# Patient Record
Sex: FEMALE | Race: BLACK OR AFRICAN AMERICAN | Employment: FULL TIME | URBAN - METROPOLITAN AREA
[De-identification: names, ages, dates, MRNs, and addresses within clinical notes are randomized per-mention and may not be internally consistent; named-entity substitution may affect disease eponyms.]

---

## 2017-03-28 ENCOUNTER — HOSPITAL ENCOUNTER (EMERGENCY)
Age: 42
Discharge: HOME | End: 2017-03-28
Attending: EMERGENCY MEDICINE
Payer: COMMERCIAL

## 2017-03-28 VITALS
BODY MASS INDEX: 35.16 KG/M2 | WEIGHT: 232 LBS | SYSTOLIC BLOOD PRESSURE: 155 MMHG | DIASTOLIC BLOOD PRESSURE: 74 MMHG | HEIGHT: 68 IN | OXYGEN SATURATION: 100 % | HEART RATE: 82 BPM | TEMPERATURE: 98 F | RESPIRATION RATE: 14 BRPM

## 2017-03-28 DIAGNOSIS — J01.90 ACUTE SINUSITIS, RECURRENCE NOT SPECIFIED, UNSPECIFIED LOCATION: Primary | ICD-10-CM

## 2017-03-28 PROCEDURE — 99283 EMERGENCY DEPT VISIT LOW MDM: CPT | Performed by: PHYSICIAN ASSISTANT

## 2017-03-28 RX ORDER — PREDNISONE 50 MG/1
50 TABLET ORAL DAILY
Qty: 3 TAB | Refills: 0 | Status: SHIPPED | OUTPATIENT
Start: 2017-03-28 | End: 2017-03-31

## 2017-03-28 RX ORDER — AZITHROMYCIN 250 MG/1
TABLET, FILM COATED ORAL
Qty: 5 TAB | Refills: 0 | Status: SHIPPED | OUTPATIENT
Start: 2017-03-28 | End: 2017-04-02

## 2017-03-28 NOTE — LETTER
[facility] [facility] EMERGENCY DEPT One [address] [id number] [phone] Work/School Note Date: 3/28/2017 To Whom It May concern: 
 
Adilene Sorto was seen and treated today in the emergency room by the following provider(s): 
Physician Assistant: JENNIFER Aponte. Adilene Sorto may return to work on 3/30/17. Sincerely, Cesia Wallace RN

## 2017-03-28 NOTE — LETTER
[facility] [place] [place] [place] [address] [place] [address] [place] [id number] 
[phone] Work/School Note Date: 3/28/2017 To Whom It May concern: 
 
Luke Domingo was seen and treated today in the emergency room by the following provider(s): 
Attending Provider: Chilango Caicedo MD 
Physician Assistant: JENNIFER Young. Luke Domingo may return to work on 3-30-17. Sincerely, JENNIFER Young

## 2017-03-28 NOTE — DISCHARGE INSTRUCTIONS
Sinusitis: Care Instructions  Your Care Instructions    Sinusitis is an infection of the lining of the sinus cavities in your head. Sinusitis often follows a cold. It causes pain and pressure in your head and face. In most cases, sinusitis gets better on its own in 1 to 2 weeks. But some mild symptoms may last for several weeks. Sometimes antibiotics are needed. Follow-up care is a key part of your treatment and safety. Be sure to make and go to all appointments, and call your doctor if you are having problems. It's also a good idea to know your test results and keep a list of the medicines you take. How can you care for yourself at home? · Take an over-the-counter pain medicine, such as acetaminophen (Tylenol), ibuprofen (Advil, Motrin), or naproxen (Aleve). Read and follow all instructions on the label. · If the doctor prescribed antibiotics, take them as directed. Do not stop taking them just because you feel better. You need to take the full course of antibiotics. · Be careful when taking over-the-counter cold or flu medicines and Tylenol at the same time. Many of these medicines have acetaminophen, which is Tylenol. Read the labels to make sure that you are not taking more than the recommended dose. Too much acetaminophen (Tylenol) can be harmful. · Breathe warm, moist air from a steamy shower, a hot bath, or a sink filled with hot water. Avoid cold, dry air. Using a humidifier in your home may help. Follow the directions for cleaning the machine. · Use saline (saltwater) nasal washes to help keep your nasal passages open and wash out mucus and bacteria. You can buy saline nose drops at a grocery store or drugstore. Or you can make your own at home by adding 1 teaspoon of salt and 1 teaspoon of baking soda to 2 cups of distilled water. If you make your own, fill a bulb syringe with the solution, insert the tip into your nostril, and squeeze gently. Sidonie Shayla your nose.   · Put a hot, wet towel or a warm gel pack on your face 3 or 4 times a day for 5 to 10 minutes each time. · Try a decongestant nasal spray like oxymetazoline (Afrin). Do not use it for more than 3 days in a row. Using it for more than 3 days can make your congestion worse. When should you call for help? Call your doctor now or seek immediate medical care if:  · You have new or worse swelling or redness in your face or around your eyes. · You have a new or higher fever. Watch closely for changes in your health, and be sure to contact your doctor if:  · You have new or worse facial pain. · The mucus from your nose becomes thicker (like pus) or has new blood in it. · You are not getting better as expected. Where can you learn more? Go to http://adriel-janiya.info/. Enter F319 in the search box to learn more about \"Sinusitis: Care Instructions. \"  Current as of: July 29, 2016  Content Version: 11.2  © 3178-6908 [employer]. Care instructions adapted under license by [employer] (which disclaims liability or warranty for this information). If you have questions about a medical condition or this instruction, always ask your healthcare professional. [place] [address] any warranty or liability for your use of this information.

## 2017-03-28 NOTE — ED PROVIDER NOTES
Patient is a 35 y.o. female presenting with sinus problems. The history is provided by the patient. Sinus Infection    This is a new problem. The current episode started 2 days ago. The problem has been gradually worsening. There has been no fever. The pain is mild. The pain has been constant since onset. Associated symptoms include congestion, sinus pressure, sore throat and cough. She has tried decongestants for the symptoms. The treatment provided mild relief. History reviewed. No pertinent past medical history. Past Surgical History:   Procedure Laterality Date   [facility] GYN      D&C 7205         History reviewed. No pertinent family history. Social History     Social History    Marital status:      Spouse name: N/A    Number of children: N/A    Years of education: N/A     Occupational History    Not on file. Social History Main Topics    Smoking status: Never Smoker    Smokeless tobacco: Not on file    Alcohol use Yes      Comment: occ    Drug use: No    Sexual activity: Not on file     Other Topics Concern    Not on file     Social History Narrative         ALLERGIES: Pcn [penicillins]    Review of Systems   HENT: Positive for congestion, sinus pressure and sore throat. Respiratory: Positive for cough. All other systems reviewed and are negative. Vitals:    03/28/17 1727   BP: (!) 169/95   Pulse: 81   Resp: 18   Temp: 98.3 °F (36.8 °C)   SpO2: 100%   Weight: 105.2 kg (232 lb)   Height: 5' 8\" (1.727 m)            Physical Exam   Constitutional: She is oriented to person, place, and time. She appears well-developed and well-nourished. No distress. HENT:   Head: Normocephalic and atraumatic. Right Ear: External ear normal.   Left Ear: External ear normal.   Nasal/sinus congestion, maxillary sinus area pain to palpation, tms clear    Eyes: Conjunctivae and EOM are normal. Pupils are equal, round, and reactive to light. Neck: Normal range of motion. Neck supple. Cardiovascular: Normal rate, regular rhythm and normal heart sounds. Pulmonary/Chest: Effort normal and breath sounds normal. No respiratory distress. She has no wheezes. Abdominal: Soft. Bowel sounds are normal. There is no tenderness. There is no rebound and no guarding. Musculoskeletal: Normal range of motion. She exhibits no edema. Neurological: She is alert and oriented to person, place, and time. Skin: Skin is warm and dry. Psychiatric: She has a normal mood and affect. Nursing note and vitals reviewed.        MDM  Number of Diagnoses or Management Options  Diagnosis management comments: Sinusitis, will treat work note given       Amount and/or Complexity of Data Reviewed  Review and summarize past medical records: yes    Risk of Complications, Morbidity, and/or Mortality  Presenting problems: low  Diagnostic procedures: low  Management options: low    Patient Progress  Patient progress: improved    ED Course       Procedures

## 2017-03-29 RX ORDER — FLUCONAZOLE 150 MG/1
150 TABLET ORAL
Qty: 2 TAB | Refills: 0 | Status: SHIPPED | OUTPATIENT
Start: 2017-03-29 | End: 2017-03-29

## 2019-02-17 ENCOUNTER — HOSPITAL ENCOUNTER (EMERGENCY)
Age: 44
Discharge: HOME | End: 2019-02-18
Attending: EMERGENCY MEDICINE
Payer: SELF-PAY

## 2019-02-17 DIAGNOSIS — T78.3XXA ANGIOEDEMA, INITIAL ENCOUNTER: Primary | ICD-10-CM

## 2019-02-17 PROCEDURE — 99284 EMERGENCY DEPT VISIT MOD MDM: CPT | Performed by: EMERGENCY MEDICINE

## 2019-02-18 VITALS
HEIGHT: 68 IN | RESPIRATION RATE: 16 BRPM | SYSTOLIC BLOOD PRESSURE: 210 MMHG | BODY MASS INDEX: 35.46 KG/M2 | WEIGHT: 234 LBS | DIASTOLIC BLOOD PRESSURE: 109 MMHG | OXYGEN SATURATION: 98 % | HEART RATE: 79 BPM | TEMPERATURE: 98.7 F

## 2019-02-18 PROCEDURE — 74011636637 HC RX REV CODE- 636/637: Performed by: EMERGENCY MEDICINE

## 2019-02-18 PROCEDURE — 74011250637 HC RX REV CODE- 250/637: Performed by: EMERGENCY MEDICINE

## 2019-02-18 RX ORDER — PREDNISONE 20 MG/1
40 TABLET ORAL DAILY
Qty: 8 TAB | Refills: 0 | Status: SHIPPED | OUTPATIENT
Start: 2019-02-18 | End: 2019-02-22

## 2019-02-18 RX ORDER — DIPHENHYDRAMINE HCL 25 MG
25 CAPSULE ORAL
Status: COMPLETED | OUTPATIENT
Start: 2019-02-18 | End: 2019-02-18

## 2019-02-18 RX ADMIN — DIPHENHYDRAMINE HYDROCHLORIDE 25 MG: 25 CAPSULE ORAL at 00:15

## 2019-02-18 RX ADMIN — PREDNISONE 60 MG: 10 TABLET ORAL at 00:15

## 2019-02-18 NOTE — ED TRIAGE NOTES
Pt arrives via POV from home, pt states she ate long eilel alvarez for dinner, all things she has ate before, pt states she has L lip swelling and eye itching with swelling. Pt states she took 50mg benadryl and ice with no relief. Pt denies difficulty swallowing, denies SHOB.

## 2019-02-18 NOTE — ED NOTES
I have reviewed discharge instructions with the patient. The patient verbalized understanding. Patient left ED via Discharge Method: ambulatory to Home with family. Opportunity for questions and clarification provided. Patient given 1 scripts. Pt in no acute distress at time of discharge. MD aware of BP To continue your aftercare when you leave the hospital, you may receive an automated call from our care team to check in on how you are doing. This is a free service and part of our promise to provide the best care and service to meet your aftercare needs.  If you have questions, or wish to unsubscribe from this service please call [phone]. Thank you for Choosing our [facility] Emergency Department.

## 2019-02-18 NOTE — DISCHARGE INSTRUCTIONS
Follow-up with your doctor later this week. Return to the emergency department if your symptoms worsen despite the prescription medications.

## 2019-02-18 NOTE — ED PROVIDER NOTES
Patient states she was eating leftovers from a fast food restaurant this evening around 9 PM.  She is noticed that she started getting some swelling to her left upper lip. She then started feeling some swelling around her eyes. She got concerned about a possible allergic reaction so her  gave her some Benadryl. This seemed to help her symptoms slightly here she came immediately here for evaluation. She denies any shortness of breath or coughing, denies similar symptoms in the past.  She has not taken any medication recently, denies any known precipitating factors. Elements of this note were created using speech recognition software. As such, errors of speech recognition may be present. History reviewed. No pertinent past medical history. Past Surgical History:  
Procedure Laterality Date  HX GYN    
 D&C [id number] History reviewed. No pertinent family history. Social History Socioeconomic History  Marital status:  Spouse name: Not on file  Number of children: Not on file  Years of education: Not on file  Highest education level: Not on file Social Needs  Financial resource strain: Not on file  Food insecurity - worry: Not on file  Food insecurity - inability: Not on file  Transportation needs - medical: Not on file  Transportation needs - non-medical: Not on file Occupational History  Not on file Tobacco Use  Smoking status: Never Smoker Substance and Sexual Activity  Alcohol use: Yes Comment: occ  Drug use: No  
 Sexual activity: Not on file Other Topics Concern  Not on file Social History Narrative  Not on file ALLERGIES: Pcn [penicillins] Review of Systems Constitutional: Negative for chills and fever. Respiratory: Negative for cough and shortness of breath. Gastrointestinal: Negative for nausea and vomiting. All other systems reviewed and are negative. Vitals: 02/17/19 2322 BP: (!) 192/121 Pulse: 79 Resp: 16 Temp: 98.7 °F (37.1 °C) SpO2: 97% Weight: 106.1 kg (234 lb) Height: 5' 8\" (1.727 m) Physical Exam  
Constitutional: She is oriented to person, place, and time. She appears well-developed and well-nourished. HENT:  
Head: Normocephalic and atraumatic. Mouth/Throat: Moderate swelling to left upper lip as indicated. There is no periorbital swelling visible Eyes: Conjunctivae are normal. Pupils are equal, round, and reactive to light. Neck: Normal range of motion. Neck supple. Cardiovascular: Normal rate, regular rhythm and normal heart sounds. Pulmonary/Chest: Effort normal and breath sounds normal.  
Musculoskeletal: She exhibits no edema or tenderness. Neurological: She is alert and oriented to person, place, and time. Skin: Skin is warm and dry. Psychiatric: She has a normal mood and affect. Her behavior is normal.  
Nursing note and vitals reviewed. MDM Number of Diagnoses or Management Options Angioedema, initial encounter: new and does not require workup Diagnosis management comments: 12:13 AM symptoms improved with Benadryl, will start her on prednisone Risk of Complications, Morbidity, and/or Mortality Presenting problems: moderate Diagnostic procedures: moderate Management options: moderate Patient Progress Patient progress: stable Procedures

## 2021-12-10 ENCOUNTER — APPOINTMENT (OUTPATIENT)
Dept: GENERAL RADIOLOGY | Age: 46
DRG: 639 | End: 2021-12-10
Attending: EMERGENCY MEDICINE
Payer: COMMERCIAL

## 2021-12-10 ENCOUNTER — HOSPITAL ENCOUNTER (EMERGENCY)
Age: 46
Discharge: OTHER INSTITUTION | DRG: 639 | End: 2021-12-11
Attending: EMERGENCY MEDICINE
Payer: COMMERCIAL

## 2021-12-10 DIAGNOSIS — E11.10 DIABETIC KETOACIDOSIS WITHOUT COMA ASSOCIATED WITH TYPE 2 DIABETES MELLITUS (HCC): Primary | ICD-10-CM

## 2021-12-10 DIAGNOSIS — N17.9 AKI (ACUTE KIDNEY INJURY) (HCC): ICD-10-CM

## 2021-12-10 DIAGNOSIS — E86.0 DEHYDRATION: ICD-10-CM

## 2021-12-10 PROCEDURE — 74011250636 HC RX REV CODE- 250/636: Performed by: EMERGENCY MEDICINE

## 2021-12-10 PROCEDURE — 80053 COMPREHEN METABOLIC PANEL: CPT

## 2021-12-10 PROCEDURE — 82962 GLUCOSE BLOOD TEST: CPT

## 2021-12-10 PROCEDURE — 83690 ASSAY OF LIPASE: CPT

## 2021-12-10 PROCEDURE — 85025 COMPLETE CBC W/AUTO DIFF WBC: CPT

## 2021-12-10 PROCEDURE — 96375 TX/PRO/DX INJ NEW DRUG ADDON: CPT

## 2021-12-10 PROCEDURE — 99285 EMERGENCY DEPT VISIT HI MDM: CPT

## 2021-12-10 PROCEDURE — 71046 X-RAY EXAM CHEST 2 VIEWS: CPT

## 2021-12-10 PROCEDURE — 82803 BLOOD GASES ANY COMBINATION: CPT

## 2021-12-10 PROCEDURE — 96365 THER/PROPH/DIAG IV INF INIT: CPT

## 2021-12-10 PROCEDURE — 83735 ASSAY OF MAGNESIUM: CPT

## 2021-12-10 PROCEDURE — 93005 ELECTROCARDIOGRAM TRACING: CPT | Performed by: EMERGENCY MEDICINE

## 2021-12-10 PROCEDURE — 96366 THER/PROPH/DIAG IV INF ADDON: CPT

## 2021-12-10 RX ORDER — METOCLOPRAMIDE HYDROCHLORIDE 5 MG/ML
10 INJECTION INTRAMUSCULAR; INTRAVENOUS
Status: COMPLETED | OUTPATIENT
Start: 2021-12-10 | End: 2021-12-11

## 2021-12-10 RX ORDER — SODIUM CHLORIDE 0.9 % (FLUSH) 0.9 %
5-10 SYRINGE (ML) INJECTION EVERY 8 HOURS
Status: DISCONTINUED | OUTPATIENT
Start: 2021-12-10 | End: 2021-12-11 | Stop reason: HOSPADM

## 2021-12-10 RX ORDER — SODIUM CHLORIDE 0.9 % (FLUSH) 0.9 %
5-10 SYRINGE (ML) INJECTION AS NEEDED
Status: DISCONTINUED | OUTPATIENT
Start: 2021-12-10 | End: 2021-12-11 | Stop reason: HOSPADM

## 2021-12-10 RX ORDER — DIPHENHYDRAMINE HYDROCHLORIDE 50 MG/ML
25 INJECTION, SOLUTION INTRAMUSCULAR; INTRAVENOUS
Status: COMPLETED | OUTPATIENT
Start: 2021-12-10 | End: 2021-12-11

## 2021-12-10 RX ADMIN — SODIUM CHLORIDE 1000 ML: 900 INJECTION, SOLUTION INTRAVENOUS at 23:56

## 2021-12-11 ENCOUNTER — HOSPITAL ENCOUNTER (INPATIENT)
Age: 46
LOS: 3 days | Discharge: HOME | DRG: 639 | End: 2021-12-14
Attending: FAMILY MEDICINE | Admitting: FAMILY MEDICINE
Payer: COMMERCIAL

## 2021-12-11 VITALS
OXYGEN SATURATION: 98 % | HEART RATE: 96 BPM | DIASTOLIC BLOOD PRESSURE: 75 MMHG | SYSTOLIC BLOOD PRESSURE: 115 MMHG | BODY MASS INDEX: 39.4 KG/M2 | TEMPERATURE: 97.7 F | HEIGHT: 68 IN | RESPIRATION RATE: 21 BRPM | WEIGHT: 260 LBS

## 2021-12-11 PROBLEM — I10 ESSENTIAL HYPERTENSION: Chronic | Status: ACTIVE | Noted: 2018-07-09

## 2021-12-11 PROBLEM — E11.10 DKA, TYPE 2 (HCC): Status: ACTIVE | Noted: 2021-12-11

## 2021-12-11 PROBLEM — I10 ESSENTIAL HYPERTENSION: Status: ACTIVE | Noted: 2018-07-09

## 2021-12-11 PROBLEM — E10.10 DKA, TYPE 1 (HCC): Status: ACTIVE | Noted: 2021-12-11

## 2021-12-11 LAB
ADMINISTERED INITIALS, ADMINIT: NORMAL
ALBUMIN SERPL-MCNC: 4 G/DL (ref 3.5–5)
ALBUMIN/GLOB SERPL: 0.7 {RATIO} (ref 1.2–3.5)
ALP SERPL-CCNC: 160 U/L (ref 50–136)
ALT SERPL-CCNC: 33 U/L (ref 12–65)
ANION GAP SERPL CALC-SCNC: 15 MMOL/L (ref 7–16)
ANION GAP SERPL CALC-SCNC: 20 MMOL/L (ref 7–16)
ANION GAP SERPL CALC-SCNC: 3 MMOL/L (ref 7–16)
ANION GAP SERPL CALC-SCNC: 5 MMOL/L (ref 7–16)
ANION GAP SERPL CALC-SCNC: 6 MMOL/L (ref 7–16)
ARTERIAL PATENCY WRIST A: ABNORMAL
AST SERPL-CCNC: 18 U/L (ref 15–37)
BASE DEFICIT BLDV-SCNC: 8.3 MMOL/L
BASOPHILS # BLD: 0.1 K/UL (ref 0–0.2)
BASOPHILS NFR BLD: 1 % (ref 0–2)
BDY SITE: ABNORMAL
BILIRUB SERPL-MCNC: 0.6 MG/DL (ref 0.2–1.1)
BUN SERPL-MCNC: 27 MG/DL (ref 6–23)
BUN SERPL-MCNC: 29 MG/DL (ref 6–23)
BUN SERPL-MCNC: 30 MG/DL (ref 6–23)
BUN SERPL-MCNC: 31 MG/DL (ref 6–23)
BUN SERPL-MCNC: 34 MG/DL (ref 6–23)
CALCIUM SERPL-MCNC: 10.4 MG/DL (ref 8.3–10.4)
CALCIUM SERPL-MCNC: 8.7 MG/DL (ref 8.3–10.4)
CALCIUM SERPL-MCNC: 9.5 MG/DL (ref 8.3–10.4)
CHLORIDE SERPL-SCNC: 100 MMOL/L (ref 98–107)
CHLORIDE SERPL-SCNC: 110 MMOL/L (ref 98–107)
CHLORIDE SERPL-SCNC: 116 MMOL/L (ref 98–107)
CHLORIDE SERPL-SCNC: 117 MMOL/L (ref 98–107)
CHLORIDE SERPL-SCNC: 117 MMOL/L (ref 98–107)
CO2 SERPL-SCNC: 19 MMOL/L (ref 21–32)
CO2 SERPL-SCNC: 19 MMOL/L (ref 21–32)
CO2 SERPL-SCNC: 26 MMOL/L (ref 21–32)
CO2 SERPL-SCNC: 28 MMOL/L (ref 21–32)
CO2 SERPL-SCNC: 29 MMOL/L (ref 21–32)
COVID-19 RAPID TEST, COVR: NOT DETECTED
CREAT SERPL-MCNC: 1.31 MG/DL (ref 0.6–1)
CREAT SERPL-MCNC: 1.42 MG/DL (ref 0.6–1)
CREAT SERPL-MCNC: 1.51 MG/DL (ref 0.6–1)
CREAT SERPL-MCNC: 1.65 MG/DL (ref 0.6–1)
CREAT SERPL-MCNC: 2 MG/DL (ref 0.6–1)
D50 ADMINISTERED, D50ADM: 0 ML
D50 ORDER, D50ORD: 0 ML
DIFFERENTIAL METHOD BLD: ABNORMAL
EOSINOPHIL # BLD: 0 K/UL (ref 0–0.8)
EOSINOPHIL NFR BLD: 0 % (ref 0.5–7.8)
ERYTHROCYTE [DISTWIDTH] IN BLOOD BY AUTOMATED COUNT: 14.4 % (ref 11.9–14.6)
EST. AVERAGE GLUCOSE BLD GHB EST-MCNC: 321 MG/DL
GAS FLOW.O2 O2 DELIVERY SYS: ABNORMAL L/MIN
GLOBULIN SER CALC-MCNC: 6.1 G/DL (ref 2.3–3.5)
GLSCOM COMMENTS: NORMAL
GLUCOSE BLD STRIP.AUTO-MCNC: 178 MG/DL (ref 65–100)
GLUCOSE BLD STRIP.AUTO-MCNC: 184 MG/DL (ref 65–100)
GLUCOSE BLD STRIP.AUTO-MCNC: 208 MG/DL (ref 65–100)
GLUCOSE BLD STRIP.AUTO-MCNC: 213 MG/DL (ref 65–100)
GLUCOSE BLD STRIP.AUTO-MCNC: 221 MG/DL (ref 65–100)
GLUCOSE BLD STRIP.AUTO-MCNC: 228 MG/DL (ref 65–100)
GLUCOSE BLD STRIP.AUTO-MCNC: 273 MG/DL (ref 65–100)
GLUCOSE BLD STRIP.AUTO-MCNC: 345 MG/DL (ref 65–100)
GLUCOSE BLD STRIP.AUTO-MCNC: 389 MG/DL (ref 65–100)
GLUCOSE BLD STRIP.AUTO-MCNC: 411 MG/DL (ref 65–100)
GLUCOSE BLD STRIP.AUTO-MCNC: 412 MG/DL (ref 65–100)
GLUCOSE BLD STRIP.AUTO-MCNC: 459 MG/DL (ref 65–100)
GLUCOSE BLD STRIP.AUTO-MCNC: 595 MG/DL (ref 65–100)
GLUCOSE SERPL-MCNC: 208 MG/DL (ref 65–100)
GLUCOSE SERPL-MCNC: 223 MG/DL (ref 65–100)
GLUCOSE SERPL-MCNC: 312 MG/DL (ref 65–100)
GLUCOSE SERPL-MCNC: 547 MG/DL (ref 65–100)
GLUCOSE SERPL-MCNC: 811 MG/DL (ref 65–100)
GLUCOSE, GLC: 595 MG/DL
HBA1C MFR BLD: 12.8 % (ref 4.2–6.3)
HCO3 BLDV-SCNC: 18.5 MMOL/L (ref 23–28)
HCT VFR BLD AUTO: 48.2 % (ref 35.8–46.3)
HGB BLD-MCNC: 15 G/DL (ref 11.7–15.4)
HIGH TARGET, HITG: 180 MG/DL
IMM GRANULOCYTES # BLD AUTO: 0 K/UL (ref 0–0.5)
IMM GRANULOCYTES NFR BLD AUTO: 0 % (ref 0–5)
INSULIN ADMINSTERED, INSADM: 10.7 UNITS/HOUR
INSULIN ORDER, INSORD: 10.7 UNITS/HOUR
LIPASE SERPL-CCNC: 499 U/L (ref 73–393)
LOW TARGET, LOT: 140 MG/DL
LYMPHOCYTES # BLD: 1.4 K/UL (ref 0.5–4.6)
LYMPHOCYTES NFR BLD: 16 % (ref 13–44)
MAGNESIUM SERPL-MCNC: 2.8 MG/DL (ref 1.8–2.4)
MAGNESIUM SERPL-MCNC: 2.8 MG/DL (ref 1.8–2.4)
MAGNESIUM SERPL-MCNC: 3 MG/DL (ref 1.8–2.4)
MAGNESIUM SERPL-MCNC: 3.2 MG/DL (ref 1.8–2.4)
MAGNESIUM SERPL-MCNC: 3.4 MG/DL (ref 1.8–2.4)
MCH RBC QN AUTO: 30.4 PG (ref 26.1–32.9)
MCHC RBC AUTO-ENTMCNC: 31.1 G/DL (ref 31.4–35)
MCV RBC AUTO: 97.6 FL (ref 79.6–97.8)
MINUTES UNTIL NEXT BG, NBG: 60 MIN
MONOCYTES # BLD: 0.4 K/UL (ref 0.1–1.3)
MONOCYTES NFR BLD: 5 % (ref 4–12)
MULTIPLIER, MUL: 0.02
NEUTS SEG # BLD: 6.9 K/UL (ref 1.7–8.2)
NEUTS SEG NFR BLD: 79 % (ref 43–78)
NRBC # BLD: 0 K/UL (ref 0–0.2)
ORDER INITIALS, ORDINIT: NORMAL
PCO2 BLDV: 41.4 MMHG (ref 41–51)
PH BLDV: 7.26 [PH] (ref 7.32–7.42)
PHOSPHATE SERPL-MCNC: 3.1 MG/DL (ref 2.5–4.5)
PLATELET # BLD AUTO: 317 K/UL (ref 150–450)
PMV BLD AUTO: 12.6 FL (ref 9.4–12.3)
PO2 BLDV: 31 MMHG
POTASSIUM SERPL-SCNC: 3.7 MMOL/L (ref 3.5–5.1)
POTASSIUM SERPL-SCNC: 3.7 MMOL/L (ref 3.5–5.1)
POTASSIUM SERPL-SCNC: 4 MMOL/L (ref 3.5–5.1)
POTASSIUM SERPL-SCNC: 4.1 MMOL/L (ref 3.5–5.1)
POTASSIUM SERPL-SCNC: 5.5 MMOL/L (ref 3.5–5.1)
PROT SERPL-MCNC: 10.1 G/DL (ref 6.3–8.2)
RBC # BLD AUTO: 4.94 M/UL (ref 4.05–5.2)
SAO2 % BLDV: 50.7 % (ref 65–88)
SERVICE CMNT-IMP: ABNORMAL
SODIUM SERPL-SCNC: 139 MMOL/L (ref 136–145)
SODIUM SERPL-SCNC: 144 MMOL/L (ref 136–145)
SODIUM SERPL-SCNC: 148 MMOL/L (ref 136–145)
SODIUM SERPL-SCNC: 149 MMOL/L (ref 136–145)
SODIUM SERPL-SCNC: 150 MMOL/L (ref 136–145)
SOURCE, COVRS: NORMAL
SPECIMEN TYPE: ABNORMAL
WBC # BLD AUTO: 8.8 K/UL (ref 4.3–11.1)

## 2021-12-11 PROCEDURE — 74011636637 HC RX REV CODE- 636/637: Performed by: FAMILY MEDICINE

## 2021-12-11 PROCEDURE — 74011250636 HC RX REV CODE- 250/636: Performed by: EMERGENCY MEDICINE

## 2021-12-11 PROCEDURE — 83036 HEMOGLOBIN GLYCOSYLATED A1C: CPT

## 2021-12-11 PROCEDURE — 82962 GLUCOSE BLOOD TEST: CPT

## 2021-12-11 PROCEDURE — 74011250636 HC RX REV CODE- 250/636: Performed by: FAMILY MEDICINE

## 2021-12-11 PROCEDURE — 74011000258 HC RX REV CODE- 258: Performed by: EMERGENCY MEDICINE

## 2021-12-11 PROCEDURE — 36415 COLL VENOUS BLD VENIPUNCTURE: CPT

## 2021-12-11 PROCEDURE — 83735 ASSAY OF MAGNESIUM: CPT

## 2021-12-11 PROCEDURE — 84100 ASSAY OF PHOSPHORUS: CPT

## 2021-12-11 PROCEDURE — 74011636637 HC RX REV CODE- 636/637: Performed by: STUDENT IN AN ORGANIZED HEALTH CARE EDUCATION/TRAINING PROGRAM

## 2021-12-11 PROCEDURE — 84681 ASSAY OF C-PEPTIDE: CPT

## 2021-12-11 PROCEDURE — 74011250637 HC RX REV CODE- 250/637: Performed by: FAMILY MEDICINE

## 2021-12-11 PROCEDURE — 74011000258 HC RX REV CODE- 258: Performed by: FAMILY MEDICINE

## 2021-12-11 PROCEDURE — 80048 BASIC METABOLIC PNL TOTAL CA: CPT

## 2021-12-11 PROCEDURE — 87635 SARS-COV-2 COVID-19 AMP PRB: CPT

## 2021-12-11 PROCEDURE — 74011000258 HC RX REV CODE- 258: Performed by: STUDENT IN AN ORGANIZED HEALTH CARE EDUCATION/TRAINING PROGRAM

## 2021-12-11 PROCEDURE — 65610000001 HC ROOM ICU GENERAL

## 2021-12-11 PROCEDURE — 74011636637 HC RX REV CODE- 636/637: Performed by: EMERGENCY MEDICINE

## 2021-12-11 RX ORDER — INSULIN GLARGINE 100 [IU]/ML
25 INJECTION, SOLUTION SUBCUTANEOUS DAILY
Status: DISCONTINUED | OUTPATIENT
Start: 2021-12-11 | End: 2021-12-12

## 2021-12-11 RX ORDER — DEXTROSE 50 % IN WATER (D50W) INTRAVENOUS SYRINGE
25-50 AS NEEDED
Status: DISCONTINUED | OUTPATIENT
Start: 2021-12-11 | End: 2021-12-13 | Stop reason: SDUPTHER

## 2021-12-11 RX ORDER — DEXTROSE 50 % IN WATER (D50W) INTRAVENOUS SYRINGE
25-50 AS NEEDED
Status: DISCONTINUED | OUTPATIENT
Start: 2021-12-11 | End: 2021-12-11 | Stop reason: SDUPTHER

## 2021-12-11 RX ORDER — METFORMIN HYDROCHLORIDE 500 MG/1
TABLET, FILM COATED, EXTENDED RELEASE ORAL
COMMUNITY

## 2021-12-11 RX ORDER — ACETAMINOPHEN 650 MG/1
650 SUPPOSITORY RECTAL
Status: DISCONTINUED | OUTPATIENT
Start: 2021-12-11 | End: 2021-12-14 | Stop reason: HOSPADM

## 2021-12-11 RX ORDER — SODIUM CHLORIDE 9 MG/ML
250 INJECTION, SOLUTION INTRAVENOUS CONTINUOUS
Status: DISCONTINUED | OUTPATIENT
Start: 2021-12-11 | End: 2021-12-11

## 2021-12-11 RX ORDER — INSULIN LISPRO 100 [IU]/ML
INJECTION, SOLUTION INTRAVENOUS; SUBCUTANEOUS
Status: DISCONTINUED | OUTPATIENT
Start: 2021-12-11 | End: 2021-12-14 | Stop reason: HOSPADM

## 2021-12-11 RX ORDER — DEXTROSE 40 %
15 GEL (GRAM) ORAL AS NEEDED
Status: DISCONTINUED | OUTPATIENT
Start: 2021-12-11 | End: 2021-12-13 | Stop reason: SDUPTHER

## 2021-12-11 RX ORDER — INSULIN GLARGINE 100 [IU]/ML
25 INJECTION, SOLUTION SUBCUTANEOUS DAILY
Status: DISCONTINUED | OUTPATIENT
Start: 2021-12-12 | End: 2021-12-11

## 2021-12-11 RX ORDER — HYDROCODONE BITARTRATE AND HOMATROPINE METHYLBROMIDE 1.5; 5 MG/5ML; MG/5ML
5 SYRUP ORAL
Status: DISCONTINUED | OUTPATIENT
Start: 2021-12-11 | End: 2021-12-14 | Stop reason: HOSPADM

## 2021-12-11 RX ORDER — PROMETHAZINE HYDROCHLORIDE 25 MG/1
12.5 TABLET ORAL
Status: DISCONTINUED | OUTPATIENT
Start: 2021-12-11 | End: 2021-12-14 | Stop reason: HOSPADM

## 2021-12-11 RX ORDER — DEXTROSE MONOHYDRATE AND SODIUM CHLORIDE 5; .9 G/100ML; G/100ML
200 INJECTION, SOLUTION INTRAVENOUS CONTINUOUS
Status: DISCONTINUED | OUTPATIENT
Start: 2021-12-11 | End: 2021-12-11

## 2021-12-11 RX ORDER — POLYETHYLENE GLYCOL 3350 17 G/17G
17 POWDER, FOR SOLUTION ORAL DAILY
Status: DISCONTINUED | OUTPATIENT
Start: 2021-12-11 | End: 2021-12-12

## 2021-12-11 RX ORDER — GUAIFENESIN 100 MG/5ML
100 SOLUTION ORAL
Status: DISCONTINUED | OUTPATIENT
Start: 2021-12-11 | End: 2021-12-14 | Stop reason: HOSPADM

## 2021-12-11 RX ORDER — ONDANSETRON 2 MG/ML
4 INJECTION INTRAMUSCULAR; INTRAVENOUS
Status: DISCONTINUED | OUTPATIENT
Start: 2021-12-11 | End: 2021-12-14 | Stop reason: HOSPADM

## 2021-12-11 RX ORDER — HYDROCHLOROTHIAZIDE 25 MG/1
25 TABLET ORAL DAILY
COMMUNITY

## 2021-12-11 RX ORDER — ENOXAPARIN SODIUM 100 MG/ML
40 INJECTION SUBCUTANEOUS DAILY
Status: DISCONTINUED | OUTPATIENT
Start: 2021-12-11 | End: 2021-12-14 | Stop reason: HOSPADM

## 2021-12-11 RX ORDER — SODIUM CHLORIDE 0.9 % (FLUSH) 0.9 %
5-40 SYRINGE (ML) INJECTION AS NEEDED
Status: DISCONTINUED | OUTPATIENT
Start: 2021-12-11 | End: 2021-12-14 | Stop reason: HOSPADM

## 2021-12-11 RX ORDER — SODIUM CHLORIDE 0.9 % (FLUSH) 0.9 %
5-40 SYRINGE (ML) INJECTION EVERY 8 HOURS
Status: DISCONTINUED | OUTPATIENT
Start: 2021-12-11 | End: 2021-12-14 | Stop reason: HOSPADM

## 2021-12-11 RX ORDER — DEXTROSE 40 %
15 GEL (GRAM) ORAL AS NEEDED
Status: DISCONTINUED | OUTPATIENT
Start: 2021-12-11 | End: 2021-12-11 | Stop reason: SDUPTHER

## 2021-12-11 RX ORDER — ACETAMINOPHEN 325 MG/1
650 TABLET ORAL
Status: DISCONTINUED | OUTPATIENT
Start: 2021-12-11 | End: 2021-12-14 | Stop reason: HOSPADM

## 2021-12-11 RX ADMIN — ACETAMINOPHEN 650 MG: 325 TABLET, FILM COATED ORAL at 19:23

## 2021-12-11 RX ADMIN — INSULIN GLARGINE 25 UNITS: 100 INJECTION, SOLUTION SUBCUTANEOUS at 15:08

## 2021-12-11 RX ADMIN — Medication 10 ML: at 04:44

## 2021-12-11 RX ADMIN — METOCLOPRAMIDE HYDROCHLORIDE 10 MG: 5 INJECTION INTRAMUSCULAR; INTRAVENOUS at 00:47

## 2021-12-11 RX ADMIN — ENOXAPARIN SODIUM 40 MG: 100 INJECTION SUBCUTANEOUS at 09:04

## 2021-12-11 RX ADMIN — SODIUM CHLORIDE 10.7 UNITS/HR: 900 INJECTION, SOLUTION INTRAVENOUS at 03:50

## 2021-12-11 RX ADMIN — INSULIN LISPRO 2 UNITS: 100 INJECTION, SOLUTION INTRAVENOUS; SUBCUTANEOUS at 16:00

## 2021-12-11 RX ADMIN — SODIUM CHLORIDE 250 ML/HR: 900 INJECTION, SOLUTION INTRAVENOUS at 04:41

## 2021-12-11 RX ADMIN — Medication 10 ML: at 13:18

## 2021-12-11 RX ADMIN — HYDROCODONE BITARTRATE AND HOMATROPINE METHYLBROMIDE 5 ML: 5; 1.5 SOLUTION ORAL at 21:33

## 2021-12-11 RX ADMIN — SODIUM CHLORIDE 250 ML/HR: 900 INJECTION, SOLUTION INTRAVENOUS at 09:00

## 2021-12-11 RX ADMIN — SODIUM CHLORIDE 5 UNITS/HR: 9 INJECTION, SOLUTION INTRAVENOUS at 01:05

## 2021-12-11 RX ADMIN — DIPHENHYDRAMINE HYDROCHLORIDE 25 MG: 50 INJECTION INTRAMUSCULAR; INTRAVENOUS at 00:44

## 2021-12-11 RX ADMIN — INSULIN LISPRO 8 UNITS: 100 INJECTION, SOLUTION INTRAVENOUS; SUBCUTANEOUS at 21:29

## 2021-12-11 RX ADMIN — Medication 10 ML: at 21:30

## 2021-12-11 RX ADMIN — DEXTROSE MONOHYDRATE AND SODIUM CHLORIDE 200 ML/HR: 5; .9 INJECTION, SOLUTION INTRAVENOUS at 11:05

## 2021-12-11 RX ADMIN — SODIUM CHLORIDE 12.2 UNITS/HR: 900 INJECTION, SOLUTION INTRAVENOUS at 12:23

## 2021-12-11 NOTE — ED TRIAGE NOTES
Arrives with face mask in place. Ambulatory to triage with reports \"dehydration and vertigo\". Reports n/v, productive cough with yellow sputum, runny nose, dizziness with position changes. States diarrhea last week however now with constipation. Denies chest pain, abdominal pain, urinary symptoms, fever/chills, numbness/tingling, head pain, difficulty walking/speaking. Recently diagnosed with type2 DM, started on metformin however states unable to keep down. Has not received glucometer and strips yet. Fully vaccinated. Onset of symptoms 1.5 weeks ago. BGL HI in triage.

## 2021-12-11 NOTE — PROGRESS NOTES
Hospitalist Progress Note   Admit Date:  2021  3:30 AM   Name:  Fransico Bauer   Age:  48 y.o. Sex:  female  :  1975   MRN:  [id number]   Room:  [facility]/    Presenting Complaint: No chief complaint on file. Reason(s) for Admission: DKA, type 1 [facility]) [E10.10]     Hospital Course & Interval History:   Patient was admitted today  for DKA    Subjective (21): Patient was seen and examined at the bedside. Patient feeling very well. Patient still on insulin drip this morning. Patient denied any cardiac chest pain, shortness of breath, abdominal pain, any neurologic deficit. Assessment & Plan:   Patient was seen day of . We will not bill for this encounter    Patient presented with DKA. Now currently off insulin drip and started on Lantus 25 units daily. Anion gap closed. We will continue to monitor. Patient was also started on sliding scale insulin. We will continue to monitor glucose and titrate accordingly. Diabetes management has been consulted and appreciate recommendations. Anticipate patient being discharged within the next 24 to 48 hours.  A1c 12.8    Continue Lovenox for DVT prophylaxis    Diet:  ADULT DIET Regular; 3 carb choices (45 gm/meal)  DVT PPx: Lovenox  Code status: Full Code    Hospital Problems as of 2021 Never Reviewed          Codes Class Noted - Resolved POA    * (Principal) [facility] ICD-10-CM: E11.10  ICD-9-CM: 250.12  2021 - Present Yes        Essential hypertension (Chronic) ICD-10-CM: I10  ICD-9-CM: 401.9  2018 - Present Yes    Overview Signed 2021  4:17 AM by Jose Angel Gloria MD     Last Assessment & Plan:   Formatting of this note might be different from the original.  Stable,no change in therapy;Continue present management                   Objective:     Patient Vitals for the past 24 hrs:   Temp Pulse Resp BP SpO2   21 1518 97.8 °F (36.6 °C) 74 24 (!) 151/84 94 %   21 1333  80 21 124/81 97 % 12/11/21 1303  81 21 (!) 140/88 94 %   12/11/21 1233  77 19 123/80 94 %   12/11/21 1203  79 20 (!) 140/83 94 %   12/11/21 1133  81 19 (!) 147/71 95 %   12/11/21 1114 98.6 °F (37 °C) 81 21 (!) 146/92 100 %   12/11/21 1103  88 19 (!) 146/92 96 %   12/11/21 1033  83 18 134/89 91 %   12/11/21 1013  94 28 123/80 94 %   12/11/21 1012  92 (!) 44  94 %   12/11/21 0933  87 21 121/76 92 %   12/11/21 0903  89 21 129/83 95 %   12/11/21 0833  88 19 133/76 93 %   12/11/21 0733 97.5 °F (36.4 °C) 89 24 121/75 95 %   12/11/21 0634  92 20 121/73 95 %   12/11/21 0604  89 18 130/81 93 %   12/11/21 0534  91 20 118/78 94 %   12/11/21 0504  89 20 129/74 93 %   12/11/21 0434  87 19 115/84 96 %   12/11/21 0344 97.9 °F (36.6 °C) 92 16 120/76 98 %     Oxygen Therapy  O2 Sat (%): 94 % (12/11/21 1518)  Pulse via Oximetry: 81 beats per minute (12/11/21 1333)  O2 Device: None (Room air) (12/11/21 0720)    Estimated body mass index is 35.93 kg/m² as calculated from the following:    Height as of 12/10/21: 5' 8\" (1.727 m). Weight as of this encounter: 107.2 kg (236 lb 5.3 oz). Intake/Output Summary (Last 24 hours) at 12/11/2021 1541  Last data filed at 12/11/2021 0614  Gross per 24 hour   Intake 588.74 ml   Output    Net 588.74 ml         Physical Exam:   Blood pressure (!) 151/84, pulse 74, temperature 97.8 °F (36.6 °C), resp. rate 24, weight 107.2 kg (236 lb 5.3 oz), SpO2 94 %. General:    Well nourished. No overt distress  Head:  Normocephalic, atraumatic  Eyes:  Sclerae appear normal.  Pupils equally round. ENT:  Nares appear normal, no drainage. Moist oral mucosa  Neck:  No restricted ROM. Trachea midline   CV:   RRR. No m/r/g. No jugular venous distension. Lungs:   CTAB. No wheezing, rhonchi, or rales. Respirations even, unlabored  Abdomen: Bowel sounds present. Soft, nontender, nondistended. Extremities: No cyanosis or clubbing. No edema  Skin:     No rashes and normal coloration. Warm and dry. Neuro:  CN II-XII grossly intact. Sensation intact. A&Ox3  Psych:  Normal mood and affect. I have reviewed ordered lab tests and independently visualized imaging below:    Recent Labs:  Recent Results (from the past 48 hour(s))   POC VENOUS BLOOD GAS    Collection Time: 12/10/21 11:45 PM   Result Value Ref Range    Device: ROOM AIR      pH, venous (POC) 7.26 (L) 7.32 - 7.42      pCO2, venous (POC) 41.4 41 - 51 MMHG    pO2, venous (POC) 31 mmHg    HCO3, venous (POC) 18.5 (L) 23 - 28 MMOL/L    sO2, venous (POC) 50.7 (L) 65 - 88 %    Base deficit, venous (POC) 8.3 mmol/L    Allens test (POC) NOT APPLICABLE      Site UVC      Specimen type (POC) VENOUS BLOOD      Performed by Byron     Critical value read back  THE [facility] FOR [facility]     Respiratory comment: [id number]    CBC WITH AUTOMATED DIFF    Collection Time: 12/10/21 11:55 PM   Result Value Ref Range    WBC 8.8 4.3 - 11.1 K/uL    RBC 4.94 4.05 - 5.2 M/uL    HGB 15.0 11.7 - 15.4 g/dL    HCT 48.2 (H) 35.8 - 46.3 %    MCV 97.6 79.6 - 97.8 FL    MCH 30.4 26.1 - 32.9 PG    MCHC 31.1 (L) 31.4 - 35.0 g/dL    RDW 14.4 11.9 - 14.6 %    PLATELET 417 055 - 382 K/uL    MPV 12.6 (H) 9.4 - 12.3 FL    ABSOLUTE NRBC 0.00 0.0 - 0.2 K/uL    DF AUTOMATED      NEUTROPHILS 79 (H) 43 - 78 %    LYMPHOCYTES 16 13 - 44 %    MONOCYTES 5 4.0 - 12.0 %    EOSINOPHILS 0 (L) 0.5 - 7.8 %    BASOPHILS 1 0.0 - 2.0 %    IMMATURE GRANULOCYTES 0 0.0 - 5.0 %    ABS. NEUTROPHILS 6.9 1.7 - 8.2 K/UL    ABS. LYMPHOCYTES 1.4 0.5 - 4.6 K/UL    ABS. MONOCYTES 0.4 0.1 - 1.3 K/UL    ABS. EOSINOPHILS 0.0 0.0 - 0.8 K/UL    ABS. BASOPHILS 0.1 0.0 - 0.2 K/UL    ABS. IMM.  GRANS. 0.0 0.0 - 0.5 K/UL   METABOLIC PANEL, COMPREHENSIVE    Collection Time: 12/10/21 11:55 PM   Result Value Ref Range    Sodium 139 136 - 145 mmol/L    Potassium 5.5 (H) 3.5 - 5.1 mmol/L    Chloride 100 98 - 107 mmol/L    CO2 19 (L) 21 - 32 mmol/L    Anion gap 20 (H) 7 - 16 mmol/L    Glucose 811 (HH) 65 - 100 mg/dL    BUN 34 (H) 6 - 23 MG/DL Creatinine 2.00 (H) 0.6 - 1.0 MG/DL    GFR est AA 34 (L) >60 ml/min/1.73m2    GFR est non-AA 28 (L) >60 ml/min/1.73m2    Calcium 10.4 8.3 - 10.4 MG/DL    Bilirubin, total 0.6 0.2 - 1.1 MG/DL    ALT (SGPT) 33 12 - 65 U/L    AST (SGOT) 18 15 - 37 U/L    Alk.  phosphatase 160 (H) 50 - 136 U/L    Protein, total 10.1 (H) 6.3 - 8.2 g/dL    Albumin 4.0 3.5 - 5.0 g/dL    Globulin 6.1 (H) 2.3 - 3.5 g/dL    A-G Ratio 0.7 (L) 1.2 - 3.5     MAGNESIUM    Collection Time: 12/10/21 11:55 PM   Result Value Ref Range    Magnesium 3.4 (H) 1.8 - 2.4 mg/dL   LIPASE    Collection Time: 12/10/21 11:55 PM   Result Value Ref Range    Lipase 499 (H) 73 - 393 U/L   EKG, 12 LEAD, INITIAL    Collection Time: 12/11/21 12:06 AM   Result Value Ref Range    Ventricular Rate 86 BPM    Atrial Rate 86 BPM    P-R Interval 172 ms    QRS Duration 113 ms    Q-T Interval 390 ms    QTC Calculation (Bezet) 467 ms    Calculated P Axis 56 degrees    Calculated R Axis -70 degrees    Calculated T Axis 52 degrees    Diagnosis       Sinus rhythm  Consider right atrial enlargement  Left anterior fascicular block  Low voltage, precordial leads  Probable anteroseptal infarct, old     COVID-19 RAPID TEST    Collection Time: 12/11/21  2:27 AM   Result Value Ref Range    Specimen source NASAL      COVID-19 rapid test Not detected NOTD     GLUCOSE, POC    Collection Time: 12/11/21  3:50 AM   Result Value Ref Range    Glucose (POC) 595 (HH) 65 - 100 mg/dL    Performed by Lebron Patel    Collection Time: 12/11/21  4:21 AM   Result Value Ref Range    Glucose 595 mg/dL    Insulin order 10.7 units/hour    Insulin adminstered 10.7 units/hour    Multiplier 0.020     Low target 140 mg/dL    High target 180 mg/dL    D50 order 0.0 ml    D50 administered 0.00 ml    Minutes until next BG 60 min    Order initials dm     Administered initials dm     GLSCOM Comments     GLUCOSE, POC    Collection Time: 12/11/21  4:55 AM   Result Value Ref Range    Glucose (POC) 459 (HH) 65 - 100 mg/dL    Performed by Amy 86, BASIC    Collection Time: 12/11/21  4:56 AM   Result Value Ref Range    Sodium 144 136 - 145 mmol/L    Potassium 4.0 3.5 - 5.1 mmol/L    Chloride 110 (H) 98 - 107 mmol/L    CO2 19 (L) 21 - 32 mmol/L    Anion gap 15 7 - 16 mmol/L    Glucose 547 (HH) 65 - 100 mg/dL    BUN 31 (H) 6 - 23 MG/DL    Creatinine 1.65 (H) 0.6 - 1.0 MG/DL    GFR est AA 43 (L) >60 ml/min/1.73m2    GFR est non-AA 35 (L) >60 ml/min/1.73m2    Calcium 9.5 8.3 - 10.4 MG/DL   MAGNESIUM    Collection Time: 12/11/21  4:56 AM   Result Value Ref Range    Magnesium 3.2 (H) 1.8 - 2.4 mg/dL   PHOSPHORUS    Collection Time: 12/11/21  4:56 AM   Result Value Ref Range    Phosphorus 3.1 2.5 - 4.5 MG/DL   HEMOGLOBIN A1C WITH EAG    Collection Time: 12/11/21  4:56 AM   Result Value Ref Range    Hemoglobin A1c 12.8 (H) 4.20 - 6.30 %    Est. average glucose 321 mg/dL   GLUCOSE, POC    Collection Time: 12/11/21  6:05 AM   Result Value Ref Range    Glucose (POC) 411 (H) 65 - 100 mg/dL    Performed by Kervin Berman    GLUCOSE, POC    Collection Time: 12/11/21  6:58 AM   Result Value Ref Range    Glucose (POC) 412 (H) 65 - 100 mg/dL    Performed by Gabriel    GLUCOSE, POC    Collection Time: 12/11/21  8:04 AM   Result Value Ref Range    Glucose (POC) 389 (H) 65 - 100 mg/dL    Performed by Gabriel    METABOLIC PANEL, BASIC    Collection Time: 12/11/21  8:54 AM   Result Value Ref Range    Sodium 148 (H) 136 - 145 mmol/L    Potassium 3.7 3.5 - 5.1 mmol/L    Chloride 116 (H) 98 - 107 mmol/L    CO2 26 21 - 32 mmol/L    Anion gap 6 (L) 7 - 16 mmol/L    Glucose 312 (H) 65 - 100 mg/dL    BUN 30 (H) 6 - 23 MG/DL    Creatinine 1.51 (H) 0.6 - 1.0 MG/DL    GFR est AA 47 (L) >60 ml/min/1.73m2    GFR est non-AA 39 (L) >60 ml/min/1.73m2    Calcium 9.5 8.3 - 10.4 MG/DL   MAGNESIUM    Collection Time: 12/11/21  8:54 AM   Result Value Ref Range    Magnesium 3.0 (H) 1.8 - 2.4 mg/dL GLUCOSE, POC    Collection Time: 12/11/21  9:01 AM   Result Value Ref Range    Glucose (POC) 273 (H) 65 - 100 mg/dL    Performed by [address] [place] [address] Ne, POC    Collection Time: 12/11/21 10:02 AM   Result Value Ref Range    Glucose (POC) 228 (H) 65 - 100 mg/dL    Performed by [address] [place] [address], POC    Collection Time: 12/11/21 11:07 AM   Result Value Ref Range    Glucose (POC) 221 (H) 65 - 100 mg/dL    Performed by Pombaia    GLUCOSE, POC    Collection Time: 12/11/21 12:07 PM   Result Value Ref Range    Glucose (POC) 213 (H) 65 - 100 mg/dL    Performed by Manflu    METABOLIC PANEL, BASIC    Collection Time: 12/11/21 12:51 PM   Result Value Ref Range    Sodium 150 (H) 136 - 145 mmol/L    Potassium 3.7 3.5 - 5.1 mmol/L    Chloride 117 (H) 98 - 107 mmol/L    CO2 28 21 - 32 mmol/L    Anion gap 5 (L) 7 - 16 mmol/L    Glucose 223 (H) 65 - 100 mg/dL    BUN 29 (H) 6 - 23 MG/DL    Creatinine 1.42 (H) 0.6 - 1.0 MG/DL    GFR est AA 51 (L) >60 ml/min/1.73m2    GFR est non-AA 42 (L) >60 ml/min/1.73m2    Calcium 9.5 8.3 - 10.4 MG/DL   MAGNESIUM    Collection Time: 12/11/21 12:51 PM   Result Value Ref Range    Magnesium 2.8 (H) 1.8 - 2.4 mg/dL   GLUCOSE, POC    Collection Time: 12/11/21  1:10 PM   Result Value Ref Range    Glucose (POC) 208 (H) 65 - 100 mg/dL    Performed by Pombaia    GLUCOSE, POC    Collection Time: 12/11/21  2:13 PM   Result Value Ref Range    Glucose (POC) 184 (H) 65 - 100 mg/dL    Performed by Smarter RemarketerRGoGardena        All Micro Results     None          Other Studies:  XR CHEST PA LAT    Result Date: 12/10/2021  EXAM: XR CHEST PA LAT HISTORY: cough-weakness. TECHNIQUE: PA and lateral views of the chest. COMPARISON: None available. FINDINGS: The cardiac silhouette, mediastinum, and pulmonary vasculature are within normal limits. There is no consolidation, pleural effusion, or pneumothorax. No significant osseous abnormalities are observed.      No evidence of an acute intrathoracic process. Current Meds:  Current Facility-Administered Medications   Medication Dose Route Frequency    dextrose 40% (GLUTOSE) oral gel 1 Tube  15 g Oral PRN    glucagon (GLUCAGEN) injection 1 mg  1 mg IntraMUSCular PRN    dextrose (D50W) injection syrg 12.5-25 g  25-50 mL IntraVENous PRN    sodium chloride (NS) flush 5-40 mL  5-40 mL IntraVENous Q8H    sodium chloride (NS) flush 5-40 mL  5-40 mL IntraVENous PRN    acetaminophen (TYLENOL) tablet 650 mg  650 mg Oral Q6H PRN    Or    acetaminophen (TYLENOL) suppository 650 mg  650 mg Rectal Q6H PRN    polyethylene glycol (MIRALAX) packet 17 g  17 g Oral DAILY    promethazine (PHENERGAN) tablet 12.5 mg  12.5 mg Oral Q6H PRN    Or    ondansetron (ZOFRAN) injection 4 mg  4 mg IntraVENous Q6H PRN    enoxaparin (LOVENOX) injection 40 mg  40 mg SubCUTAneous DAILY    influenza vaccine 2021-22 (6 mos+)(PF) (FLUARIX/FLULAVAL/FLUZONE QUAD) injection 0.5 mL  1 Each IntraMUSCular PRIOR TO DISCHARGE    insulin lispro (HUMALOG) injection   SubCUTAneous AC&HS    insulin glargine (LANTUS) injection 25 Units  25 Units SubCUTAneous DAILY       Signed:  Simone Arreaga MD    Part of this note may have been written by using a voice dictation software. The note has been proof read but may still contain some grammatical/other typographical errors.

## 2021-12-11 NOTE — PROGRESS NOTES
Bedside and Verbal shift change report given to [address] Dotson RN  (oncoming nurse) by Maame Parra RN  (offgoing nurse). Report included the following information SBAR, Kardex, ED Summary, Procedure Summary, Intake/Output, Recent Results, Cardiac Rhythm SR and Alarm Parameters .

## 2021-12-11 NOTE — PROGRESS NOTES
Late entry due to hands on patient care. Patient received from [facility] ED. Patient alert and oriented. Follow commands. Insulin drip continue . Dual skin assessment completed with [facility]. Skin intact. Allevyn place to sacral/coccyx. Instructed patient on use of call light. Demonstrate an understanding. Diabetes booklet given. Time allowed for questions. Bed in low/lock position.

## 2021-12-11 NOTE — ED PROVIDER NOTES
[address] Danielle Ortiz is a 48 y.o. female seen on 12/11/2021 in the [facility] EMERGENCY DEPT in room ER31/31. Chief Complaint   Patient presents with    Vomiting     HPI: 47-year-old -American female with history of uncontrolled diabetes presented to the emergency department with intermittent vomiting times a week and a half. Patient has been feeling extremely dehydrated, thirsty and states that she has been urinating all the time. Patient's blood sugar read high in triage. Patient states that she has been vomiting food that she ate 2 to 3 days ago. She is unsure whether she has ever been in DKA. Patient states that she has been coughing intermittently as well. She is vaccinated for COVID-19. She denies any fevers, chills, chest pain, abdominal pain, changes in bowel habits. Patient states that she knows she is dehydrated because she feels so lightheaded and dizzy when she gets up and moves around. Patient has no other complaints at this time. Historian: Patient    REVIEW OF SYSTEMS     Review of Systems   Constitutional: Positive for appetite change and fatigue. HENT: Negative. Respiratory: Positive for cough. Cardiovascular: Negative. Gastrointestinal: Positive for nausea and vomiting. Endocrine: Positive for polydipsia and polyuria. Genitourinary: Positive for frequency. Musculoskeletal: Negative. Skin: Negative. Neurological: Positive for light-headedness. Hematological: Negative. Psychiatric/Behavioral: Positive for decreased concentration. All other systems reviewed and are negative. PAST MEDICAL HISTORY     No past medical history on file.   Past Surgical History:   Procedure Laterality Date    HX GYN      D&C 1994     Social History     Socioeconomic History    Marital status:    Tobacco Use    Smoking status: Never Smoker   Substance and Sexual Activity    Alcohol use: Yes     Comment: occ    Drug use: No     Prior to Admission Medications   Prescriptions Last Dose Informant Patient Reported? Taking?   meclizine (ANTIVERT) 25 mg tablet   No No   Sig: Take 1 Tab by mouth three (3) times daily as needed for Dizziness. Facility-Administered Medications: None     Allergies   Allergen Reactions    Pcn [Penicillins] Hives     Denies as allergy        PHYSICAL EXAM       Vitals:    12/10/21 2211   BP: 128/77   Pulse: 96   Resp: 18   Temp: 97.7 °F (36.5 °C)   SpO2: 98%    Vital signs were reviewed. Physical Exam  Vitals and nursing note reviewed. Constitutional:       General: She is not in acute distress. Appearance: Normal appearance. She is not ill-appearing or toxic-appearing. HENT:      Head: Normocephalic and atraumatic. Mouth/Throat:      Mouth: Mucous membranes are dry. Eyes:      Extraocular Movements: Extraocular movements intact. Pupils: Pupils are equal, round, and reactive to light. Cardiovascular:      Rate and Rhythm: Normal rate and regular rhythm. Pulses: Normal pulses. Heart sounds: Normal heart sounds. Pulmonary:      Effort: Pulmonary effort is normal.      Breath sounds: Normal breath sounds. Abdominal:      Palpations: Abdomen is soft. Tenderness: There is no abdominal tenderness. There is no guarding. Musculoskeletal:         General: Normal range of motion. Cervical back: Normal range of motion. Skin:     General: Skin is warm and dry. Neurological:      General: No focal deficit present. Mental Status: She is alert and oriented to person, place, and time. Psychiatric:         Mood and Affect: Mood normal.         Behavior: Behavior normal.         Thought Content:  Thought content normal.         Judgment: Judgment normal.          MEDICAL DECISION MAKING     ED Course:    Orders Placed This Encounter    XR CHEST PA LAT    CBC with Diff    CMP    Magnesium    Lipase    VENOUS BLOOD GAS    URINALYSIS W/ RFLX MICROSCOPIC  POC Urine Dipstick    PULSE OXIMETRY CONTINUOUS    CRITICAL CARE (ASAP ONLY)    POC VENOUS BLOOD GAS    EKG (Check If Upper Abdominal Pain or symptoms of SOB, Diaphoresis, or Tachycardia)    SALINE LOCK IV ONE TIME Routine    sodium chloride (NS) flush 5-10 mL    sodium chloride (NS) flush 5-10 mL    sodium chloride 0.9 % bolus infusion 1,000 mL    sodium chloride 0.9 % bolus infusion 1,000 mL    metoclopramide HCl (REGLAN) injection 10 mg    diphenhydrAMINE (BENADRYL) injection 25 mg    insulin regular (NOVOLIN R, HUMULIN R) 100 Units in 0.9% sodium chloride 100 mL infusion     Recent Results (from the past 8 hour(s))   POC VENOUS BLOOD GAS    Collection Time: 12/10/21 11:45 PM   Result Value Ref Range    Device: ROOM AIR      pH, venous (POC) 7.26 (L) 7.32 - 7.42      pCO2, venous (POC) 41.4 41 - 51 MMHG    pO2, venous (POC) 31 mmHg    HCO3, venous (POC) 18.5 (L) 23 - 28 MMOL/L    sO2, venous (POC) 50.7 (L) 65 - 88 %    Base deficit, venous (POC) 8.3 mmol/L    Allens test (POC) NOT APPLICABLE      Site UVC      Specimen type (POC) VENOUS BLOOD      Performed by Byron     Critical value read back  THE [facility] FOR [facility]     Respiratory comment: [id number]    METABOLIC PANEL, COMPREHENSIVE    Collection Time: 12/10/21 11:55 PM   Result Value Ref Range    Sodium 139 136 - 145 mmol/L    Potassium 5.5 (H) 3.5 - 5.1 mmol/L    Chloride 100 98 - 107 mmol/L    CO2 19 (L) 21 - 32 mmol/L    Anion gap 20 (H) 7 - 16 mmol/L    Glucose 811 (HH) 65 - 100 mg/dL    BUN 34 (H) 6 - 23 MG/DL    Creatinine 2.00 (H) 0.6 - 1.0 MG/DL    GFR est AA 34 (L) >60 ml/min/1.73m2    GFR est non-AA 28 (L) >60 ml/min/1.73m2    Calcium 10.4 8.3 - 10.4 MG/DL    Bilirubin, total 0.6 0.2 - 1.1 MG/DL    ALT (SGPT) 33 12 - 65 U/L    AST (SGOT) 18 15 - 37 U/L    Alk.  phosphatase 160 (H) 50 - 136 U/L    Protein, total 10.1 (H) 6.3 - 8.2 g/dL    Albumin 4.0 3.5 - 5.0 g/dL    Globulin 6.1 (H) 2.3 - 3.5 g/dL    A-G Ratio 0.7 (L) 1.2 - 3.5     MAGNESIUM Collection Time: 12/10/21 11:55 PM   Result Value Ref Range    Magnesium 3.4 (H) 1.8 - 2.4 mg/dL   LIPASE    Collection Time: 12/10/21 11:55 PM   Result Value Ref Range    Lipase 499 (H) 73 - 393 U/L     XR CHEST PA LAT    Result Date: 12/10/2021  EXAM: XR CHEST PA LAT HISTORY: cough-weakness. TECHNIQUE: PA and lateral views of the chest. COMPARISON: None available. FINDINGS: The cardiac silhouette, mediastinum, and pulmonary vasculature are within normal limits. There is no consolidation, pleural effusion, or pneumothorax. No significant osseous abnormalities are observed. No evidence of an acute intrathoracic process. EKG interpretation personally: Rate 86. Sinus rhythm. Left anterior fascicular block. Normal CA and QT intervals. ED Course as of 12/11/21 0050   Fri Dec 10, 2021   0782 Procedure Note for Ultrasound Guided IV. IV access was not able to be obtained by nursing staff due to the patient having very difficult vein access. Skin was cleaned and disinfected prior to IV puncture. Ultrasound was used to find the vein which was compressible and does not have any ultrasound features of an artery. Under real-time ultrasound guidance peripheral access was obtained in the left upper extremity. Blood return was present and IV flushed without difficulty with no clinical signs of infiltration. IV was taped into position and there were no immediate complications noted and patient tolerated the procedure well. Procedure was completed by myself the attending physician. [JL]   6729 Patient with VBG consistent with DKA with pH of 7.25 and bicarb of 18. Patient will be started on insulin drip.  [JL]      ED Course User Index  [JL] Joe Learn, DO     MDM  Number of Diagnoses or Management Options  GOGO (acute kidney injury) ([facility]  Dehydration  Diabetic ketoacidosis without coma associated with type 2 diabetes mellitus ([facility]  Diagnosis management comments: 22-year-old -American female presented emergency department with significant hyperglycemia and signs of dehydration. Patient is a known diabetic. Patient labs consistent with DKA. Patient given aggressive fluid hydration, Reglan/Benadryl for nausea and started on insulin drip. Patient will be admitted to the hospitalist for further treatment evaluation. Amount and/or Complexity of Data Reviewed  Clinical lab tests: ordered and reviewed  Tests in the radiology section of CPT®: reviewed    Patient Progress  Patient progress: stable    Critical Care  Performed by: Jeimy Gillette DO  Authorized by: Jeimy Gillette DO     Critical care provider statement:     Critical care time (minutes):  32    Critical care was necessary to treat or prevent imminent or life-threatening deterioration of the following conditions:  Dehydration, metabolic crisis and endocrine crisis    Critical care was time spent personally by me on the following activities:  Blood draw for specimens, development of treatment plan with patient or surrogate, discussions with consultants, evaluation of patient's response to treatment, examination of patient, obtaining history from patient or surrogate, review of old charts, re-evaluation of patient's condition, pulse oximetry, ordering and review of laboratory studies and ordering and performing treatments and interventions  Comments:      DKA and GOGO requiring insulin drip and aggressive fluid hydration. Disposition: Admitted   diagnosis:     ICD-10-CM ICD-9-CM   1. Diabetic ketoacidosis without coma associated with type 2 diabetes mellitus ([facility]  E11.10 250.12   2. Dehydration  E86.0 276.51   3. GOGO (acute kidney injury) ([facility]  N17.9 584.9     ____________________________________________________________________  A portion of this note was generated using voice recognition dictation software.  While the note has been reviewed for accuracy, please note certain words and phrases may not be transcribed as intended and some grammatical and/or typographical errors may be present.

## 2021-12-11 NOTE — H&P
Hospitalist History and Physical   Admit Date:  2021  3:30 AM   Name:  Roberta Liang   Age:  48 y.o. Sex:  female  :  1975   MRN:  [id number]   Room:  [facility]/01    Presenting Complaint: Nausea and vomiting  Reason(s) for Admission: DKA, type 1 ([facility] [E10.10]     History of Present Illness: This patient was discussed with the ER provider prior to seeing the patient. Pertinent history, physical, diagnostics,   initial treatments, and further plans reviewed. Roberta Liang is a 48 y.o. female with medical history of newly diagnosed diabetes, who presented with nausea, vomiting, and dehydration. She additionally reports productive cough with yellow sputum, runny nose, dizziness. She states she did have diarrhea last week, however now feels constipated. She denies fever/chills, abdominal pain, chest pain, shortness of breath. She does report recently being diagnosed with diabetes, presumably type II. She was prescribed Metformin, states she is really been too sick to keep it down. She has not yet received a glucometer and test strips. She states her symptoms started about 10 days ago. She has been noting polyuria and polydipsia for an even longer period of time, which is what led to testing and diagnosis of her diabetes. Triage noted her blood sugar should read \"high\". Lab testing showed this to be over 800. Review of Systems:  10 systems reviewed and negative except as noted in HPI. Assessment & Plan: Active Problems:    DKA, type 1 ([facility] (2021)  She is in DKA, it is unclear if she is type I or II diabetic. As she was just recently diagnosed. Will order C-peptide level to assess if patient is making any of her own insulin.   Start glucose stabilizer protocol and monitor labs closely  Consult diabetic education for new diagnosis        Dispo/Discharge Planning:   Inpatient, ICU; anticipate 2 days    Diet: Clear liquids, advance as tolerated  VTE ppx:  Lovenox  Code status: Full    There is a high probability of acute organ impairment or life-threatening deterioration in the patient's condition from: DKA  Critical care interventions: IV insulin infusion  Total critical care time spent: 40 minutes. Time is indicative of direct patient attendance at bedside and on the patient's floor nearby. Includes time spent at bedside performing history and exam, performing chart review, discussing findings and treatment plan with patient and/or family, discussing patient with consultants and colleagues, ordering and reviewing pertinent laboratory and radiographic evaluations, and discussing patient with nursing staff. Time excludes procedures. Hospital Problems as of 12/11/2021 Never Reviewed          Codes Class Noted - Resolved POA    DKA, type 1 ([facility] ICD-10-CM: E10.10  ICD-9-CM: 250.13  12/11/2021 - Present Unknown              Past History:  No past medical history on file. Past Surgical History:   Procedure Laterality Date   [facility]      D&C 4148      Allergies   Allergen Reactions    Pcn [Penicillins] Hives     Denies as allergy      Social History     Tobacco Use    Smoking status: Never Smoker    Smokeless tobacco: Not on file   Substance Use Topics    Alcohol use: Yes     Comment: occ      No family history on file. She does report a family history of diabetes      There is no immunization history on file for this patient. Prior to Admit Medications:  Current Outpatient Medications   Medication Instructions    meclizine (ANTIVERT) 25 mg, Oral, 3 TIMES DAILY AS NEEDED       Objective:     Patient Vitals for the past 24 hrs:   Temp Pulse Resp BP SpO2   12/11/21 0344 97.9 °F (36.6 °C) 92 16 120/76 98 %     Oxygen Therapy  O2 Sat (%): 98 % (12/11/21 0344)    Estimated body mass index is 35.93 kg/m² as calculated from the following:    Height as of 12/10/21: 5' 8\" (1.727 m). Weight as of this encounter: 107.2 kg (236 lb 5.3 oz).   No intake or output data in the 24 hours ending 12/11/21 0417      Physical Exam:    Blood pressure 120/76, pulse 92, temperature 97.9 °F (36.6 °C), resp. rate 16, weight 107.2 kg (236 lb 5.3 oz), SpO2 98 %. General:    Well nourished. No apparent distress  HENT:  Normocephalic, atraumatic. Nares appear normal, no drainage. Oropharynx is clear with tacky mucous membranes  Eyes:  Sclerae appear normal.  PERRL; EOMI  Neck:  No restricted ROM. Trachea midline   CV:   RRR. No jugular venous distension. No LE edema  Lungs:   CTAB. No wheezing, rhonchi, or rales. Respirations even, unlabored  Abdomen: Bowel sounds present. Soft, nontender, nondistended. Musc/Sk: No cyanosis or clubbing. Skin:     No obvious rashes and normal coloration. Warm and dry. Neuro:  CN II-XII grossly intact. Eye exam as noted above; Moves extremities equally and appropriately  Psych:   Alert and oriented x 4; Judgement and insight are normal;     I have reviewed ordered lab tests and reports of imaging below:    Last 24hr Labs:  Recent Results (from the past 24 hour(s))   POC VENOUS BLOOD GAS    Collection Time: 12/10/21 11:45 PM   Result Value Ref Range    Device: ROOM AIR      pH, venous (POC) 7.26 (L) 7.32 - 7.42      pCO2, venous (POC) 41.4 41 - 51 MMHG    pO2, venous (POC) 31 mmHg    HCO3, venous (POC) 18.5 (L) 23 - 28 MMOL/L    sO2, venous (POC) 50.7 (L) 65 - 88 %    Base deficit, venous (POC) 8.3 mmol/L    Allens test (POC) NOT APPLICABLE      Site UVC      Specimen type (POC) VENOUS BLOOD      Performed by Byron     Critical value read back  [facility]     Respiratory comment: [email or web address]    CBC WITH AUTOMATED DIFF    Collection Time: 12/10/21 11:55 PM   Result Value Ref Range    WBC 8.8 4.3 - 11.1 K/uL    RBC 4.94 4.05 - 5.2 M/uL    HGB 15.0 11.7 - 15.4 g/dL    HCT 48.2 (H) 35.8 - 46.3 %    MCV 97.6 79.6 - 97.8 FL    MCH 30.4 26.1 - 32.9 PG    MCHC 31.1 (L) 31.4 - 35.0 g/dL    RDW 14.4 11.9 - 14.6 %    PLATELET 297 744 - 195 K/uL    MPV 12.6 (H) 9.4 - 12.3 FL    ABSOLUTE NRBC 0.00 0.0 - 0.2 K/uL    DF AUTOMATED      NEUTROPHILS 79 (H) 43 - 78 %    LYMPHOCYTES 16 13 - 44 %    MONOCYTES 5 4.0 - 12.0 %    EOSINOPHILS 0 (L) 0.5 - 7.8 %    BASOPHILS 1 0.0 - 2.0 %    IMMATURE GRANULOCYTES 0 0.0 - 5.0 %    ABS. NEUTROPHILS 6.9 1.7 - 8.2 K/UL    ABS. LYMPHOCYTES 1.4 0.5 - 4.6 K/UL    ABS. MONOCYTES 0.4 0.1 - 1.3 K/UL    ABS. EOSINOPHILS 0.0 0.0 - 0.8 K/UL    ABS. BASOPHILS 0.1 0.0 - 0.2 K/UL    ABS. IMM. GRANS. 0.0 0.0 - 0.5 K/UL   METABOLIC PANEL, COMPREHENSIVE    Collection Time: 12/10/21 11:55 PM   Result Value Ref Range    Sodium 139 136 - 145 mmol/L    Potassium 5.5 (H) 3.5 - 5.1 mmol/L    Chloride 100 98 - 107 mmol/L    CO2 19 (L) 21 - 32 mmol/L    Anion gap 20 (H) 7 - 16 mmol/L    Glucose 811 (HH) 65 - 100 mg/dL    BUN 34 (H) 6 - 23 MG/DL    Creatinine 2.00 (H) 0.6 - 1.0 MG/DL    GFR est AA 34 (L) >60 ml/min/1.73m2    GFR est non-AA 28 (L) >60 ml/min/1.73m2    Calcium 10.4 8.3 - 10.4 MG/DL    Bilirubin, total 0.6 0.2 - 1.1 MG/DL    ALT (SGPT) 33 12 - 65 U/L    AST (SGOT) 18 15 - 37 U/L    Alk. phosphatase 160 (H) 50 - 136 U/L    Protein, total 10.1 (H) 6.3 - 8.2 g/dL    Albumin 4.0 3.5 - 5.0 g/dL    Globulin 6.1 (H) 2.3 - 3.5 g/dL    A-G Ratio 0.7 (L) 1.2 - 3.5     MAGNESIUM    Collection Time: 12/10/21 11:55 PM   Result Value Ref Range    Magnesium 3.4 (H) 1.8 - 2.4 mg/dL   LIPASE    Collection Time: 12/10/21 11:55 PM   Result Value Ref Range    Lipase 499 (H) 73 - 393 U/L   COVID-19 RAPID TEST    Collection Time: 12/11/21  2:27 AM   Result Value Ref Range    Specimen source NASAL      COVID-19 rapid test Not detected NOTD     GLUCOSE, POC    Collection Time: 12/11/21  3:50 AM   Result Value Ref Range    Glucose (POC) 595 (HH) 65 - 100 mg/dL    Performed by Easton Orta        All Micro Results     None          Other Studies:  XR CHEST PA LAT    Result Date: 12/10/2021  EXAM: XR CHEST PA LAT HISTORY: cough-weakness.   TECHNIQUE: PA and lateral views of the chest. COMPARISON: None available. FINDINGS: The cardiac silhouette, mediastinum, and pulmonary vasculature are within normal limits. There is no consolidation, pleural effusion, or pneumothorax. No significant osseous abnormalities are observed. No evidence of an acute intrathoracic process. Signed:  Luis A Adhikari MD    Part of this note may have been written by using a voice dictation software. The note has been proof read but may still contain some grammatical/other typographical errors.

## 2021-12-11 NOTE — PROGRESS NOTES
Problem: Diabetes Self-Management  Goal: *Disease process and treatment process  Description: Define diabetes and identify own type of diabetes; list 3 options for treating diabetes. Outcome: Progressing Towards Goal  Goal: *Incorporating nutritional management into lifestyle  Description: Describe effect of type, amount and timing of food on blood glucose; list 3 methods for planning meals. Outcome: Progressing Towards Goal  Goal: *Incorporating physical activity into lifestyle  Description: State effect of exercise on blood glucose levels. Outcome: Progressing Towards Goal  Goal: *Developing strategies to promote health/change behavior  Description: Define the ABC's of diabetes; identify appropriate screenings, schedule and personal plan for screenings. Outcome: Progressing Towards Goal  Goal: *Using medications safely  Description: State effect of diabetes medications on diabetes; name diabetes medication taking, action and side effects. Outcome: Progressing Towards Goal  Goal: *Monitoring blood glucose, interpreting and using results  Description: Identify recommended blood glucose targets  and personal targets. Outcome: Progressing Towards Goal  Goal: *Prevention, detection, treatment of acute complications  Description: List symptoms of hyper- and hypoglycemia; describe how to treat low blood sugar and actions for lowering  high blood glucose level. Outcome: Progressing Towards Goal  Goal: *Prevention, detection and treatment of chronic complications  Description: Define the natural course of diabetes and describe the relationship of blood glucose levels to long term complications of diabetes.   Outcome: Progressing Towards Goal  Goal: *Developing strategies to address psychosocial issues  Description: Describe feelings about living with diabetes; identify support needed and support network  Outcome: Progressing Towards Goal  Goal: *Insulin pump training  Outcome: Progressing Towards Goal  Goal: *Sick day guidelines  Outcome: Progressing Towards Goal  Goal: *Patient Specific Goal (EDIT GOAL, INSERT TEXT)  Outcome: Progressing Towards Goal     Problem: Patient Education: Go to Patient Education Activity  Goal: Patient/Family Education  Outcome: Progressing Towards Goal     Problem: Falls - Risk of  Goal: *Absence of Falls  Description: Document Cherylle Cockayne Fall Risk and appropriate interventions in the flowsheet.   Outcome: Progressing Towards Goal  Note: Fall Risk Interventions:            Medication Interventions: Assess postural VS orthostatic hypotension, Bed/chair exit alarm, Evaluate medications/consider consulting pharmacy, Patient to call before getting OOB, Teach patient to arise slowly    Elimination Interventions: Bed/chair exit alarm, Call light in reach, Patient to call for help with toileting needs              Problem: Patient Education: Go to Patient Education Activity  Goal: Patient/Family Education  Outcome: Progressing Towards Goal

## 2021-12-11 NOTE — PROGRESS NOTES
TRANSFER - IN REPORT:    Verbal report received from [address] [place] [address] on Danielle Ortiz  being received from ED for routine progression of care      Report consisted of patients Situation, Background, Assessment and   Recommendations(SBAR). Information from the following report(s) SBAR, ED Summary, Intake/Output, MAR, Recent Results, Cardiac Rhythm sinus rhythm and Alarm Parameters  was reviewed with the receiving nurse. Opportunity for questions and clarification was provided. Assessment completed upon patients arrival to unit and care assumed.

## 2021-12-12 LAB
ALBUMIN SERPL-MCNC: 3 G/DL (ref 3.5–5)
ALBUMIN/GLOB SERPL: 0.7 {RATIO} (ref 1.2–3.5)
ALP SERPL-CCNC: 102 U/L (ref 50–136)
ALT SERPL-CCNC: 20 U/L (ref 12–65)
ANION GAP SERPL CALC-SCNC: 5 MMOL/L (ref 7–16)
AST SERPL-CCNC: 12 U/L (ref 15–37)
ATRIAL RATE: 86 BPM
BASOPHILS # BLD: 0.1 K/UL (ref 0–0.2)
BASOPHILS NFR BLD: 1 % (ref 0–2)
BILIRUB DIRECT SERPL-MCNC: 0.2 MG/DL
BILIRUB SERPL-MCNC: 0.5 MG/DL (ref 0.2–1.1)
BUN SERPL-MCNC: 25 MG/DL (ref 6–23)
C PEPTIDE SERPL-MCNC: 0.7 NG/ML (ref 1.1–4.4)
CALCIUM SERPL-MCNC: 8.8 MG/DL (ref 8.3–10.4)
CALCULATED P AXIS, ECG09: 56 DEGREES
CALCULATED R AXIS, ECG10: -70 DEGREES
CALCULATED T AXIS, ECG11: 52 DEGREES
CHLORIDE SERPL-SCNC: 114 MMOL/L (ref 98–107)
CO2 SERPL-SCNC: 26 MMOL/L (ref 21–32)
CREAT SERPL-MCNC: 1.26 MG/DL (ref 0.6–1)
DIAGNOSIS, 93000: NORMAL
DIFFERENTIAL METHOD BLD: NORMAL
EOSINOPHIL # BLD: 0.1 K/UL (ref 0–0.8)
EOSINOPHIL NFR BLD: 1 % (ref 0.5–7.8)
ERYTHROCYTE [DISTWIDTH] IN BLOOD BY AUTOMATED COUNT: 14.3 % (ref 11.9–14.6)
GLOBULIN SER CALC-MCNC: 4.4 G/DL (ref 2.3–3.5)
GLUCOSE BLD STRIP.AUTO-MCNC: 292 MG/DL (ref 65–100)
GLUCOSE BLD STRIP.AUTO-MCNC: 318 MG/DL (ref 65–100)
GLUCOSE BLD STRIP.AUTO-MCNC: 341 MG/DL (ref 65–100)
GLUCOSE BLD STRIP.AUTO-MCNC: 382 MG/DL (ref 65–100)
GLUCOSE SERPL-MCNC: 331 MG/DL (ref 65–100)
HCT VFR BLD AUTO: 41.2 % (ref 35.8–46.3)
HGB BLD-MCNC: 13.1 G/DL (ref 11.7–15.4)
IMM GRANULOCYTES # BLD AUTO: 0 K/UL (ref 0–0.5)
IMM GRANULOCYTES NFR BLD AUTO: 0 % (ref 0–5)
LYMPHOCYTES # BLD: 2.7 K/UL (ref 0.5–4.6)
LYMPHOCYTES NFR BLD: 36 % (ref 13–44)
MAGNESIUM SERPL-MCNC: 2.5 MG/DL (ref 1.8–2.4)
MCH RBC QN AUTO: 30.5 PG (ref 26.1–32.9)
MCHC RBC AUTO-ENTMCNC: 31.8 G/DL (ref 31.4–35)
MCV RBC AUTO: 96 FL (ref 79.6–97.8)
MONOCYTES # BLD: 0.7 K/UL (ref 0.1–1.3)
MONOCYTES NFR BLD: 10 % (ref 4–12)
NEUTS SEG # BLD: 4 K/UL (ref 1.7–8.2)
NEUTS SEG NFR BLD: 53 % (ref 43–78)
NRBC # BLD: 0 K/UL (ref 0–0.2)
P-R INTERVAL, ECG05: 172 MS
PLATELET # BLD AUTO: 243 K/UL (ref 150–450)
PMV BLD AUTO: 11.8 FL (ref 9.4–12.3)
POTASSIUM SERPL-SCNC: 3.8 MMOL/L (ref 3.5–5.1)
PROT SERPL-MCNC: 7.4 G/DL (ref 6.3–8.2)
Q-T INTERVAL, ECG07: 390 MS
QRS DURATION, ECG06: 113 MS
QTC CALCULATION (BEZET), ECG08: 467 MS
RBC # BLD AUTO: 4.29 M/UL (ref 4.05–5.2)
SERVICE CMNT-IMP: ABNORMAL
SODIUM SERPL-SCNC: 145 MMOL/L (ref 136–145)
VENTRICULAR RATE, ECG03: 86 BPM
WBC # BLD AUTO: 7.5 K/UL (ref 4.3–11.1)

## 2021-12-12 PROCEDURE — 80076 HEPATIC FUNCTION PANEL: CPT

## 2021-12-12 PROCEDURE — 74011636637 HC RX REV CODE- 636/637: Performed by: STUDENT IN AN ORGANIZED HEALTH CARE EDUCATION/TRAINING PROGRAM

## 2021-12-12 PROCEDURE — 74011250637 HC RX REV CODE- 250/637: Performed by: STUDENT IN AN ORGANIZED HEALTH CARE EDUCATION/TRAINING PROGRAM

## 2021-12-12 PROCEDURE — 80048 BASIC METABOLIC PNL TOTAL CA: CPT

## 2021-12-12 PROCEDURE — 36415 COLL VENOUS BLD VENIPUNCTURE: CPT

## 2021-12-12 PROCEDURE — 2709999900 HC NON-CHARGEABLE SUPPLY

## 2021-12-12 PROCEDURE — 85025 COMPLETE CBC W/AUTO DIFF WBC: CPT

## 2021-12-12 PROCEDURE — 82962 GLUCOSE BLOOD TEST: CPT

## 2021-12-12 PROCEDURE — 83735 ASSAY OF MAGNESIUM: CPT

## 2021-12-12 PROCEDURE — 65270000029 HC RM PRIVATE

## 2021-12-12 PROCEDURE — 74011250637 HC RX REV CODE- 250/637: Performed by: FAMILY MEDICINE

## 2021-12-12 PROCEDURE — 74011250636 HC RX REV CODE- 250/636: Performed by: FAMILY MEDICINE

## 2021-12-12 RX ORDER — INSULIN GLARGINE 100 [IU]/ML
32 INJECTION, SOLUTION SUBCUTANEOUS DAILY
Status: DISCONTINUED | OUTPATIENT
Start: 2021-12-12 | End: 2021-12-12

## 2021-12-12 RX ORDER — INSULIN LISPRO 100 [IU]/ML
10 INJECTION, SOLUTION INTRAVENOUS; SUBCUTANEOUS ONCE
Status: COMPLETED | OUTPATIENT
Start: 2021-12-12 | End: 2021-12-12

## 2021-12-12 RX ORDER — INSULIN GLARGINE 100 [IU]/ML
30 INJECTION, SOLUTION SUBCUTANEOUS DAILY
Status: DISCONTINUED | OUTPATIENT
Start: 2021-12-12 | End: 2021-12-13

## 2021-12-12 RX ORDER — INSULIN GLARGINE 100 [IU]/ML
10 INJECTION, SOLUTION SUBCUTANEOUS
Status: DISCONTINUED | OUTPATIENT
Start: 2021-12-12 | End: 2021-12-13

## 2021-12-12 RX ORDER — POLYETHYLENE GLYCOL 3350 17 G/17G
17 POWDER, FOR SOLUTION ORAL 3 TIMES DAILY
Status: DISCONTINUED | OUTPATIENT
Start: 2021-12-12 | End: 2021-12-14 | Stop reason: HOSPADM

## 2021-12-12 RX ADMIN — INSULIN LISPRO 8 UNITS: 100 INJECTION, SOLUTION INTRAVENOUS; SUBCUTANEOUS at 21:51

## 2021-12-12 RX ADMIN — Medication 10 ML: at 13:19

## 2021-12-12 RX ADMIN — HYDROCODONE BITARTRATE AND HOMATROPINE METHYLBROMIDE 5 ML: 5; 1.5 SOLUTION ORAL at 03:17

## 2021-12-12 RX ADMIN — INSULIN LISPRO 10 UNITS: 100 INJECTION, SOLUTION INTRAVENOUS; SUBCUTANEOUS at 11:44

## 2021-12-12 RX ADMIN — POLYETHYLENE GLYCOL 3350 17 G: 17 POWDER, FOR SOLUTION ORAL at 15:59

## 2021-12-12 RX ADMIN — GUAIFENESIN 100 MG: 200 SOLUTION ORAL at 01:00

## 2021-12-12 RX ADMIN — ENOXAPARIN SODIUM 40 MG: 100 INJECTION SUBCUTANEOUS at 08:07

## 2021-12-12 RX ADMIN — Medication 10 ML: at 21:54

## 2021-12-12 RX ADMIN — INSULIN LISPRO 10 UNITS: 100 INJECTION, SOLUTION INTRAVENOUS; SUBCUTANEOUS at 11:30

## 2021-12-12 RX ADMIN — INSULIN GLARGINE 30 UNITS: 100 INJECTION, SOLUTION SUBCUTANEOUS at 08:10

## 2021-12-12 RX ADMIN — POLYETHYLENE GLYCOL 3350 17 G: 17 POWDER, FOR SOLUTION ORAL at 21:54

## 2021-12-12 RX ADMIN — INSULIN LISPRO 8 UNITS: 100 INJECTION, SOLUTION INTRAVENOUS; SUBCUTANEOUS at 17:50

## 2021-12-12 RX ADMIN — POLYETHYLENE GLYCOL 3350 17 G: 17 POWDER, FOR SOLUTION ORAL at 08:07

## 2021-12-12 RX ADMIN — INSULIN GLARGINE 10 UNITS: 100 INJECTION, SOLUTION SUBCUTANEOUS at 21:52

## 2021-12-12 RX ADMIN — INSULIN LISPRO 6 UNITS: 100 INJECTION, SOLUTION INTRAVENOUS; SUBCUTANEOUS at 07:39

## 2021-12-12 NOTE — PROGRESS NOTES
TRANSFER - OUT REPORT:    Verbal report given to LYLE Tang (name) on Sandra Ramirez  being transferred to [facility](unit) for routine progression of care       Report consisted of patients Situation, Background, Assessment and   Recommendations(SBAR). Information from the following report(s) SBAR, Kardex, ED Summary, Procedure Summary, Intake/Output, Recent Results, Cardiac Rhythm NSR and Alarm Parameters  was reviewed with the receiving nurse. Lines:   Peripheral IV 12/11/21 Anterior; Proximal; Right Antecubital (Active)   Site Assessment Clean, dry, & intact 12/12/21 1319   Phlebitis Assessment 0 12/12/21 1319   Infiltration Assessment 0 12/12/21 1319   Dressing Status Clean, dry, & intact 12/12/21 1319   Dressing Type Transparent; Tape 12/12/21 1319   Hub Color/Line Status Pink; Flushed; Patent 12/12/21 1319   Alcohol Cap Used No 12/12/21 1319        Opportunity for questions and clarification was provided.       Patient transported with:   [employer]

## 2021-12-12 NOTE — PROGRESS NOTES
Bedside and Verbal shift change report given to Aileen Hanson  (oncoming nurse) by Gilmar Quiñonez RN  (offgoing nurse). Report included the following information SBAR, Kardex, ED Summary, Procedure Summary, Intake/Output, Recent Results, Cardiac Rhythm SR and Alarm Parameters .

## 2021-12-12 NOTE — PROGRESS NOTES
Hospitalist Progress Note   Admit Date:  2021  3:30 AM   Name:  Natalie Moore   Age:  48 y.o. Sex:  female  :  1975   MRN:  [id number]   Room:  [facility]/    Presenting Complaint: No chief complaint on file. Reason(s) for Admission: DKA, type 1 [facility]) [E10.10]     Hospital Course & Interval History:   Patient was admitted today  for DKA    Subjective (21): Patient was seen and examined at the bedside. She no longer on insulin drip. Patient feeling better this morning without any complaints. Patient denied any cardiac chest pain, shortness of breath, abdominal pain, any neurologic deficit. Assessment & Plan:   DKA  Patient was started on insulin drip on admission  Anion gap closed  Started on insulin, initially 25 units Lantus daily increased to 32 units daily. 10 units at night  Will continue to monitor glucose daily  Patient also on sliding scale at this time  Diabetes management consulted  A1c of 12.8  Patient would likely benefit from seeing endocrinology outpatient for further management of her diabetes.   C-peptide pending        Diet:  ADULT DIET Regular; 3 carb choices (45 gm/meal)  DVT PPx: Lovenox  Code status: Full Code    Hospital Problems as of 2021 Never Reviewed          Codes Class Noted - Resolved POA    * (Principal) [facility] ICD-10-CM: E11.10  ICD-9-CM: 250.12  2021 - Present Yes        Essential hypertension (Chronic) ICD-10-CM: I10  ICD-9-CM: 401.9  2018 - Present Yes    Overview Signed 2021  4:17 AM by Mayela Quinn MD     Last Assessment & Plan:   Formatting of this note might be different from the original.  Marycruz Cheung change in therapy;Continue present management                   Objective:     Patient Vitals for the past 24 hrs:   Temp Pulse Resp BP SpO2   21 1525 98.3 °F (36.8 °C) 64 16 115/69 94 %   21 1502    123/82    21 1400 98.2 °F (36.8 °C) 76 16 (!) 120/104 96 %   21 1319  68 16  94 %   12/12/21 1106 98 °F (36.7 °C) 70 16 126/75 97 %   12/12/21 0756  67 16 137/77 99 %   12/12/21 0712 97.8 °F (36.6 °C) 72 13 (!) 140/77 98 %   12/12/21 0711  68 23 (!) 140/77 98 %   12/12/21 0656  67 16 125/66 92 %   12/12/21 0600  67 16 104/64 93 %   12/12/21 0455  66 13 112/69 93 %   12/12/21 0355  64 17 (!) 142/73 96 %   12/12/21 0315 97.8 °F (36.6 °C) 69 30 (!) 147/85 95 %   12/12/21 0155  68 15 139/79 97 %   12/12/21 0055  64 15 116/72 95 %   12/11/21 2355  64 17 128/78 94 %   12/11/21 2300 98 °F (36.7 °C) 66 17 118/76 94 %   12/11/21 2200  69 22 (!) 177/94 94 %   12/11/21 2103  67 18 (!) 151/83 94 %   12/11/21 2000  80 21 (!) 148/104 96 %   12/11/21 1900 98.3 °F (36.8 °C) 81 25 (!) 147/98 96 %     Oxygen Therapy  O2 Sat (%): 94 % (12/12/21 1525)  Pulse via Oximetry: 69 beats per minute (12/12/21 1319)  O2 Device: None (Room air) (12/12/21 1525)    Estimated body mass index is 36.94 kg/m² as calculated from the following:    Height as of 12/10/21: 5' 8\" (1.727 m). Weight as of this encounter: 110.2 kg (242 lb 15.2 oz). Intake/Output Summary (Last 24 hours) at 12/12/2021 1713  Last data filed at 12/12/2021 1525  Gross per 24 hour   Intake 660 ml   Output    Net 660 ml         Physical Exam:   Blood pressure 115/69, pulse 64, temperature 98.3 °F (36.8 °C), resp. rate 16, weight 110.2 kg (242 lb 15.2 oz), SpO2 94 %. General:    Well nourished. No overt distress  Head:  Normocephalic, atraumatic  Eyes:  Sclerae appear normal.  Pupils equally round. ENT:  Nares appear normal, no drainage. Moist oral mucosa  Neck:  No restricted ROM. Trachea midline   CV:   RRR. No m/r/g. No jugular venous distension. Lungs:   CTAB. No wheezing, rhonchi, or rales. Respirations even, unlabored  Abdomen: Bowel sounds present. Soft, nontender, nondistended. Extremities: No cyanosis or clubbing. No edema  Skin:     No rashes and normal coloration. Warm and dry.     Neuro:  CN II-XII grossly intact. Sensation intact. A&Ox3  Psych:  Normal mood and affect. I have reviewed ordered lab tests and independently visualized imaging below:    Recent Labs:  Recent Results (from the past 48 hour(s))   POC VENOUS BLOOD GAS    Collection Time: 12/10/21 11:45 PM   Result Value Ref Range    Device: ROOM AIR      pH, venous (POC) 7.26 (L) 7.32 - 7.42      pCO2, venous (POC) 41.4 41 - 51 MMHG    pO2, venous (POC) 31 mmHg    HCO3, venous (POC) 18.5 (L) 23 - 28 MMOL/L    sO2, venous (POC) 50.7 (L) 65 - 88 %    Base deficit, venous (POC) 8.3 mmol/L    Allens test (POC) NOT APPLICABLE      Site UVC      Specimen type (POC) VENOUS BLOOD      Performed by Byron     Critical value read back  THE [facility] FOR [facility]     Respiratory comment: [email or web address]    CBC WITH AUTOMATED DIFF    Collection Time: 12/10/21 11:55 PM   Result Value Ref Range    WBC 8.8 4.3 - 11.1 K/uL    RBC 4.94 4.05 - 5.2 M/uL    HGB 15.0 11.7 - 15.4 g/dL    HCT 48.2 (H) 35.8 - 46.3 %    MCV 97.6 79.6 - 97.8 FL    MCH 30.4 26.1 - 32.9 PG    MCHC 31.1 (L) 31.4 - 35.0 g/dL    RDW 14.4 11.9 - 14.6 %    PLATELET 514 792 - 259 K/uL    MPV 12.6 (H) 9.4 - 12.3 FL    ABSOLUTE NRBC 0.00 0.0 - 0.2 K/uL    DF AUTOMATED      NEUTROPHILS 79 (H) 43 - 78 %    LYMPHOCYTES 16 13 - 44 %    MONOCYTES 5 4.0 - 12.0 %    EOSINOPHILS 0 (L) 0.5 - 7.8 %    BASOPHILS 1 0.0 - 2.0 %    IMMATURE GRANULOCYTES 0 0.0 - 5.0 %    ABS. NEUTROPHILS 6.9 1.7 - 8.2 K/UL    ABS. LYMPHOCYTES 1.4 0.5 - 4.6 K/UL    ABS. MONOCYTES 0.4 0.1 - 1.3 K/UL    ABS. EOSINOPHILS 0.0 0.0 - 0.8 K/UL    ABS. BASOPHILS 0.1 0.0 - 0.2 K/UL    ABS. IMM.  GRANS. 0.0 0.0 - 0.5 K/UL   METABOLIC PANEL, COMPREHENSIVE    Collection Time: 12/10/21 11:55 PM   Result Value Ref Range    Sodium 139 136 - 145 mmol/L    Potassium 5.5 (H) 3.5 - 5.1 mmol/L    Chloride 100 98 - 107 mmol/L    CO2 19 (L) 21 - 32 mmol/L    Anion gap 20 (H) 7 - 16 mmol/L    Glucose 811 (HH) 65 - 100 mg/dL    BUN 34 (H) 6 - 23 MG/DL    Creatinine 2.00 (H) 0.6 - 1.0 MG/DL    GFR est AA 34 (L) >60 ml/min/1.73m2    GFR est non-AA 28 (L) >60 ml/min/1.73m2    Calcium 10.4 8.3 - 10.4 MG/DL    Bilirubin, total 0.6 0.2 - 1.1 MG/DL    ALT (SGPT) 33 12 - 65 U/L    AST (SGOT) 18 15 - 37 U/L    Alk.  phosphatase 160 (H) 50 - 136 U/L    Protein, total 10.1 (H) 6.3 - 8.2 g/dL    Albumin 4.0 3.5 - 5.0 g/dL    Globulin 6.1 (H) 2.3 - 3.5 g/dL    A-G Ratio 0.7 (L) 1.2 - 3.5     MAGNESIUM    Collection Time: 12/10/21 11:55 PM   Result Value Ref Range    Magnesium 3.4 (H) 1.8 - 2.4 mg/dL   LIPASE    Collection Time: 12/10/21 11:55 PM   Result Value Ref Range    Lipase 499 (H) 73 - 393 U/L   EKG, 12 LEAD, INITIAL    Collection Time: 12/11/21 12:06 AM   Result Value Ref Range    Ventricular Rate 86 BPM    Atrial Rate 86 BPM    P-R Interval 172 ms    QRS Duration 113 ms    Q-T Interval 390 ms    QTC Calculation (Bezet) 467 ms    Calculated P Axis 56 degrees    Calculated R Axis -70 degrees    Calculated T Axis 52 degrees    Diagnosis       Sinus rhythm  Consider right atrial enlargement  Left anterior fascicular block  Low voltage, precordial leads  Possible anteroseptal infarct, old    Confirmed by Winston Ma MD (), LEO ([id number]) on 12/12/2021 1:30:04 AM     COVID-19 RAPID TEST    Collection Time: 12/11/21  2:27 AM   Result Value Ref Range    Specimen source NASAL      COVID-19 rapid test Not detected NOTD     GLUCOSE, POC    Collection Time: 12/11/21  3:50 AM   Result Value Ref Range    Glucose (POC) 595 (HH) 65 - 100 mg/dL    Performed by Kassandra Point    Collection Time: 12/11/21  4:21 AM   Result Value Ref Range    Glucose 595 mg/dL    Insulin order 10.7 units/hour    Insulin adminstered 10.7 units/hour    Multiplier 0.020     Low target 140 mg/dL    High target 180 mg/dL    D50 order 0.0 ml    D50 administered 0.00 ml    Minutes until next BG 60 min    Order initials dm     Administered initials dm     GLSCOM Comments     GLUCOSE, POC    Collection Time: 12/11/21  4:55 AM   Result Value Ref Range    Glucose (POC) 459 (HH) 65 - 100 mg/dL    Performed by Amy Flores, BASIC    Collection Time: 12/11/21  4:56 AM   Result Value Ref Range    Sodium 144 136 - 145 mmol/L    Potassium 4.0 3.5 - 5.1 mmol/L    Chloride 110 (H) 98 - 107 mmol/L    CO2 19 (L) 21 - 32 mmol/L    Anion gap 15 7 - 16 mmol/L    Glucose 547 (HH) 65 - 100 mg/dL    BUN 31 (H) 6 - 23 MG/DL    Creatinine 1.65 (H) 0.6 - 1.0 MG/DL    GFR est AA 43 (L) >60 ml/min/1.73m2    GFR est non-AA 35 (L) >60 ml/min/1.73m2    Calcium 9.5 8.3 - 10.4 MG/DL   MAGNESIUM    Collection Time: 12/11/21  4:56 AM   Result Value Ref Range    Magnesium 3.2 (H) 1.8 - 2.4 mg/dL   PHOSPHORUS    Collection Time: 12/11/21  4:56 AM   Result Value Ref Range    Phosphorus 3.1 2.5 - 4.5 MG/DL   HEMOGLOBIN A1C WITH EAG    Collection Time: 12/11/21  4:56 AM   Result Value Ref Range    Hemoglobin A1c 12.8 (H) 4.20 - 6.30 %    Est. average glucose 321 mg/dL   GLUCOSE, POC    Collection Time: 12/11/21  6:05 AM   Result Value Ref Range    Glucose (POC) 411 (H) 65 - 100 mg/dL    Performed by Hai Zacarias    GLUCOSE, POC    Collection Time: 12/11/21  6:58 AM   Result Value Ref Range    Glucose (POC) 412 (H) 65 - 100 mg/dL    Performed by Gabriel    GLUCOSE, POC    Collection Time: 12/11/21  8:04 AM   Result Value Ref Range    Glucose (POC) 389 (H) 65 - 100 mg/dL    Performed by Gabriel    C-PEPTIDE    Collection Time: 12/11/21  8:54 AM   Result Value Ref Range    C-Peptide 0.7 (L) 1.1 - 4.4 ng/mL   METABOLIC PANEL, BASIC    Collection Time: 12/11/21  8:54 AM   Result Value Ref Range    Sodium 148 (H) 136 - 145 mmol/L    Potassium 3.7 3.5 - 5.1 mmol/L    Chloride 116 (H) 98 - 107 mmol/L    CO2 26 21 - 32 mmol/L    Anion gap 6 (L) 7 - 16 mmol/L    Glucose 312 (H) 65 - 100 mg/dL    BUN 30 (H) 6 - 23 MG/DL    Creatinine 1.51 (H) 0.6 - 1.0 MG/DL    GFR est AA 47 (L) >60 ml/min/1.73m2    GFR est non-AA 39 (L) >60 ml/min/1.73m2    Calcium 9.5 8.3 - 10.4 MG/DL   MAGNESIUM    Collection Time: 12/11/21  8:54 AM   Result Value Ref Range    Magnesium 3.0 (H) 1.8 - 2.4 mg/dL   GLUCOSE, POC    Collection Time: 12/11/21  9:01 AM   Result Value Ref Range    Glucose (POC) 273 (H) 65 - 100 mg/dL    Performed by [address] [place] [id number], POC    Collection Time: 12/11/21 10:02 AM   Result Value Ref Range    Glucose (POC) 228 (H) 65 - 100 mg/dL    Performed by [address] [place] [id number], POC    Collection Time: 12/11/21 11:07 AM   Result Value Ref Range    Glucose (POC) 221 (H) 65 - 100 mg/dL    Performed by [address] [place] [id number], POC    Collection Time: 12/11/21 12:07 PM   Result Value Ref Range    Glucose (POC) 213 (H) 65 - 100 mg/dL    Performed by [id number] [place] [id number], BASIC    Collection Time: 12/11/21 12:51 PM   Result Value Ref Range    Sodium 150 (H) 136 - 145 mmol/L    Potassium 3.7 3.5 - 5.1 mmol/L    Chloride 117 (H) 98 - 107 mmol/L    CO2 28 21 - 32 mmol/L    Anion gap 5 (L) 7 - 16 mmol/L    Glucose 223 (H) 65 - 100 mg/dL    BUN 29 (H) 6 - 23 MG/DL    Creatinine 1.42 (H) 0.6 - 1.0 MG/DL    GFR est AA 51 (L) >60 ml/min/1.73m2    GFR est non-AA 42 (L) >60 ml/min/1.73m2    Calcium 9.5 8.3 - 10.4 MG/DL   MAGNESIUM    Collection Time: 12/11/21 12:51 PM   Result Value Ref Range    Magnesium 2.8 (H) 1.8 - 2.4 mg/dL   GLUCOSE, POC    Collection Time: 12/11/21  1:10 PM   Result Value Ref Range    Glucose (POC) 208 (H) 65 - 100 mg/dL    Performed by Gabriel    GLUCOSE, POC    Collection Time: 12/11/21  2:13 PM   Result Value Ref Range    Glucose (POC) 184 (H) 65 - 100 mg/dL    Performed by Gabriel    GLUCOSE, POC    Collection Time: 12/11/21  4:00 PM   Result Value Ref Range    Glucose (POC) 178 (H) 65 - 100 mg/dL    Performed by [facility]    METABOLIC PANEL, BASIC    Collection Time: 12/11/21  5:24 PM   Result Value Ref Range    Sodium 149 (H) 136 - 145 mmol/L    Potassium 4.1 3.5 - 5.1 mmol/L Chloride 117 (H) 98 - 107 mmol/L    CO2 29 21 - 32 mmol/L    Anion gap 3 (L) 7 - 16 mmol/L    Glucose 208 (H) 65 - 100 mg/dL    BUN 27 (H) 6 - 23 MG/DL    Creatinine 1.31 (H) 0.6 - 1.0 MG/DL    GFR est AA 56 (L) >60 ml/min/1.73m2    GFR est non-AA 46 (L) >60 ml/min/1.73m2    Calcium 8.7 8.3 - 10.4 MG/DL   MAGNESIUM    Collection Time: 12/11/21  5:24 PM   Result Value Ref Range    Magnesium 2.8 (H) 1.8 - 2.4 mg/dL   GLUCOSE, POC    Collection Time: 12/11/21  9:26 PM   Result Value Ref Range    Glucose (POC) 345 (H) 65 - 100 mg/dL    Performed by Amy Flores, BASIC    Collection Time: 12/12/21  3:05 AM   Result Value Ref Range    Sodium 145 136 - 145 mmol/L    Potassium 3.8 3.5 - 5.1 mmol/L    Chloride 114 (H) 98 - 107 mmol/L    CO2 26 21 - 32 mmol/L    Anion gap 5 (L) 7 - 16 mmol/L    Glucose 331 (H) 65 - 100 mg/dL    BUN 25 (H) 6 - 23 MG/DL    Creatinine 1.26 (H) 0.6 - 1.0 MG/DL    GFR est AA 58 (L) >60 ml/min/1.73m2    GFR est non-AA 48 (L) >60 ml/min/1.73m2    Calcium 8.8 8.3 - 10.4 MG/DL   MAGNESIUM    Collection Time: 12/12/21  3:05 AM   Result Value Ref Range    Magnesium 2.5 (H) 1.8 - 2.4 mg/dL   CBC WITH AUTOMATED DIFF    Collection Time: 12/12/21  3:05 AM   Result Value Ref Range    WBC 7.5 4.3 - 11.1 K/uL    RBC 4.29 4.05 - 5.2 M/uL    HGB 13.1 11.7 - 15.4 g/dL    HCT 41.2 35.8 - 46.3 %    MCV 96.0 79.6 - 97.8 FL    MCH 30.5 26.1 - 32.9 PG    MCHC 31.8 31.4 - 35.0 g/dL    RDW 14.3 11.9 - 14.6 %    PLATELET 459 747 - 588 K/uL    MPV 11.8 9.4 - 12.3 FL    ABSOLUTE NRBC 0.00 0.0 - 0.2 K/uL    DF AUTOMATED      NEUTROPHILS 53 43 - 78 %    LYMPHOCYTES 36 13 - 44 %    MONOCYTES 10 4.0 - 12.0 %    EOSINOPHILS 1 0.5 - 7.8 %    BASOPHILS 1 0.0 - 2.0 %    IMMATURE GRANULOCYTES 0 0.0 - 5.0 %    ABS. NEUTROPHILS 4.0 1.7 - 8.2 K/UL    ABS. LYMPHOCYTES 2.7 0.5 - 4.6 K/UL    ABS. MONOCYTES 0.7 0.1 - 1.3 K/UL    ABS. EOSINOPHILS 0.1 0.0 - 0.8 K/UL    ABS. BASOPHILS 0.1 0.0 - 0.2 K/UL    ABS. IMM. GRANS. 0.0 0.0 - 0.5 K/UL   HEPATIC FUNCTION PANEL    Collection Time: 12/12/21  3:05 AM   Result Value Ref Range    Protein, total 7.4 6.3 - 8.2 g/dL    Albumin 3.0 (L) 3.5 - 5.0 g/dL    Globulin 4.4 (H) 2.3 - 3.5 g/dL    A-G Ratio 0.7 (L) 1.2 - 3.5      Bilirubin, total 0.5 0.2 - 1.1 MG/DL    Bilirubin, direct 0.2 <0.4 MG/DL    Alk. phosphatase 102 50 - 136 U/L    AST (SGOT) 12 (L) 15 - 37 U/L    ALT (SGPT) 20 12 - 65 U/L   GLUCOSE, POC    Collection Time: 12/12/21  7:38 AM   Result Value Ref Range    Glucose (POC) 292 (H) 65 - 100 mg/dL    Performed by Gabriel    GLUCOSE, POC    Collection Time: 12/12/21 11:30 AM   Result Value Ref Range    Glucose (POC) 382 (H) 65 - 100 mg/dL    Performed by Gabrile    GLUCOSE, POC    Collection Time: 12/12/21  4:44 PM   Result Value Ref Range    Glucose (POC) 318 (H) 65 - 100 mg/dL    Performed by Sebastian        All Micro Results     None          Other Studies:  No results found.     Current Meds:  Current Facility-Administered Medications   Medication Dose Route Frequency    insulin glargine (LANTUS) injection 30 Units  30 Units SubCUTAneous DAILY    polyethylene glycol (MIRALAX) packet 17 g  17 g Oral TID    insulin glargine (LANTUS) injection 10 Units  10 Units SubCUTAneous QHS    dextrose 40% (GLUTOSE) oral gel 1 Tube  15 g Oral PRN    glucagon (GLUCAGEN) injection 1 mg  1 mg IntraMUSCular PRN    dextrose (D50W) injection syrg 12.5-25 g  25-50 mL IntraVENous PRN    sodium chloride (NS) flush 5-40 mL  5-40 mL IntraVENous Q8H    sodium chloride (NS) flush 5-40 mL  5-40 mL IntraVENous PRN    acetaminophen (TYLENOL) tablet 650 mg  650 mg Oral Q6H PRN    Or    acetaminophen (TYLENOL) suppository 650 mg  650 mg Rectal Q6H PRN    promethazine (PHENERGAN) tablet 12.5 mg  12.5 mg Oral Q6H PRN    Or    ondansetron (ZOFRAN) injection 4 mg  4 mg IntraVENous Q6H PRN    enoxaparin (LOVENOX) injection 40 mg  40 mg SubCUTAneous DAILY    influenza vaccine 2021-22 (6 mos+)(PF) (FLUARIX/FLULAVAL/FLUZONE QUAD) injection 0.5 mL  1 Each IntraMUSCular PRIOR TO DISCHARGE    insulin lispro (HUMALOG) injection   SubCUTAneous AC&HS    HYDROcodone-homatropine (HYCODAN) 5-1.5 mg/5 mL (5 mL) oral solution 5 mL  5 mL Oral Q4H PRN    guaiFENesin (ROBITUSSIN) 100 mg/5 mL oral liquid 100 mg  100 mg Oral Q4H PRN       Signed:  Eric Jalloh MD    Part of this note may have been written by using a voice dictation software. The note has been proof read but may still contain some grammatical/other typographical errors.

## 2021-12-12 NOTE — PROGRESS NOTES
TRANSFER - IN REPORT:    Verbal report received from ICU(name) on Roberta Liang  being received from ICU(unit) for routine progression of care      Report consisted of patients Situation, Background, Assessment and   Recommendations(SBAR). Information from the following report(s) SBAR was reviewed with the receiving nurse. Opportunity for questions and clarification was provided. Assessment completed upon patients arrival to unit and care assumed.

## 2021-12-12 NOTE — PROGRESS NOTES
SW reviewed patient's chart and conducted a baseline assessment. Discharge plan at this time is as follows:     Care Management Interventions  PCP Verified by CM: Yes  Mode of Transport at Discharge: Self  Transition of Care Consult (CM Consult): Discharge Planning  Support Systems: Spouse/Significant Other  Confirm Follow Up Transport: Family  Discharge Location  Discharge Placement: Home with family assistance      *Please note that discharge plans can change throughout an inpatient admission.  Ensure that you are referring to the most recent social work/nurse case management note for current discharge plan*     Pam Hernandez, [address] [place] [address] Work   [facility]    * [email or web address]

## 2021-12-12 NOTE — PROGRESS NOTES
SW met with patient to discuss discharge needs. Patient lives with her  and has other local family support. Patient has a PCP and is current. Patient listed as self-pay so SW provided resources for uninsured diabetics. Patient advises that she does have insurance but was told by registration at admission to hold off on providing her insurance cards and someone would follow up with her. Patient accepted resources with verbal education. SW will notify registration of patient insurance not being in her chart. Patient does not use assistive devices to ambulate, denies any falls, and is indp in her ADLs. Plan home with family at discharge. Update: SW obtained patient's insurance card information and sent it to registration. Bruna Mock put information in the patient's chart. SW also received billing forms from registration that required the patient's signature. Forms signed and tubed back.       Jaden Dougherty LMSW    [facility]    * [email or web address]

## 2021-12-12 NOTE — PROGRESS NOTES
Problem: Diabetes Self-Management  Goal: *Disease process and treatment process  Description: Define diabetes and identify own type of diabetes; list 3 options for treating diabetes. Outcome: Progressing Towards Goal  Goal: *Incorporating nutritional management into lifestyle  Description: Describe effect of type, amount and timing of food on blood glucose; list 3 methods for planning meals. Outcome: Progressing Towards Goal  Goal: *Incorporating physical activity into lifestyle  Description: State effect of exercise on blood glucose levels. Outcome: Progressing Towards Goal  Goal: *Developing strategies to promote health/change behavior  Description: Define the ABC's of diabetes; identify appropriate screenings, schedule and personal plan for screenings. Outcome: Progressing Towards Goal  Goal: *Using medications safely  Description: State effect of diabetes medications on diabetes; name diabetes medication taking, action and side effects. Outcome: Progressing Towards Goal  Goal: *Monitoring blood glucose, interpreting and using results  Description: Identify recommended blood glucose targets  and personal targets. Outcome: Progressing Towards Goal  Goal: *Prevention, detection, treatment of acute complications  Description: List symptoms of hyper- and hypoglycemia; describe how to treat low blood sugar and actions for lowering  high blood glucose level. Outcome: Progressing Towards Goal  Goal: *Prevention, detection and treatment of chronic complications  Description: Define the natural course of diabetes and describe the relationship of blood glucose levels to long term complications of diabetes.   Outcome: Progressing Towards Goal  Goal: *Developing strategies to address psychosocial issues  Description: Describe feelings about living with diabetes; identify support needed and support network  Outcome: Progressing Towards Goal  Goal: *Insulin pump training  Outcome: Progressing Towards Goal  Goal: *Sick day guidelines  Outcome: Progressing Towards Goal  Goal: *Patient Specific Goal (EDIT GOAL, INSERT TEXT)  Outcome: Progressing Towards Goal     Problem: Patient Education: Go to Patient Education Activity  Goal: Patient/Family Education  Outcome: Progressing Towards Goal     Problem: Falls - Risk of  Goal: *Absence of Falls  Description: Document Shade Tomball Fall Risk and appropriate interventions in the flowsheet.   Outcome: Progressing Towards Goal  Note: Fall Risk Interventions:            Medication Interventions: Evaluate medications/consider consulting pharmacy, Patient to call before getting OOB    Elimination Interventions: Call light in reach, Patient to call for help with toileting needs              Problem: Patient Education: Go to Patient Education Activity  Goal: Patient/Family Education  Outcome: Progressing Towards Goal

## 2021-12-12 NOTE — PROGRESS NOTES
Bedside and Verbal shift change report given to Evan Erwin Dotson RN  (oncoming nurse) by Christiano Henry RN  (offgoing nurse). Report included the following information SBAR, Kardex, ED Summary, Procedure Summary, Intake/Output, Recent Results, Cardiac Rhythm SR and Alarm Parameters .

## 2021-12-12 NOTE — PROGRESS NOTES
Problem: Diabetes Self-Management  Goal: *Disease process and treatment process  Description: Define diabetes and identify own type of diabetes; list 3 options for treating diabetes. Outcome: Progressing Towards Goal  Goal: *Incorporating nutritional management into lifestyle  Description: Describe effect of type, amount and timing of food on blood glucose; list 3 methods for planning meals. Outcome: Progressing Towards Goal  Goal: *Incorporating physical activity into lifestyle  Description: State effect of exercise on blood glucose levels. Outcome: Progressing Towards Goal  Goal: *Developing strategies to promote health/change behavior  Description: Define the ABC's of diabetes; identify appropriate screenings, schedule and personal plan for screenings. Outcome: Progressing Towards Goal  Goal: *Using medications safely  Description: State effect of diabetes medications on diabetes; name diabetes medication taking, action and side effects. Outcome: Progressing Towards Goal  Goal: *Monitoring blood glucose, interpreting and using results  Description: Identify recommended blood glucose targets  and personal targets. Outcome: Progressing Towards Goal  Goal: *Prevention, detection, treatment of acute complications  Description: List symptoms of hyper- and hypoglycemia; describe how to treat low blood sugar and actions for lowering  high blood glucose level. Outcome: Progressing Towards Goal  Goal: *Prevention, detection and treatment of chronic complications  Description: Define the natural course of diabetes and describe the relationship of blood glucose levels to long term complications of diabetes.   Outcome: Progressing Towards Goal  Goal: *Developing strategies to address psychosocial issues  Description: Describe feelings about living with diabetes; identify support needed and support network  Outcome: Progressing Towards Goal  Goal: *Insulin pump training  Outcome: Progressing Towards Goal  Goal: *Sick day guidelines  Outcome: Progressing Towards Goal  Goal: *Patient Specific Goal (EDIT GOAL, INSERT TEXT)  Outcome: Progressing Towards Goal     Problem: Patient Education: Go to Patient Education Activity  Goal: Patient/Family Education  Outcome: Progressing Towards Goal     Problem: Falls - Risk of  Goal: *Absence of Falls  Description: Document Manlius Embs Fall Risk and appropriate interventions in the flowsheet.   Outcome: Progressing Towards Goal  Note: Fall Risk Interventions:            Medication Interventions: Assess postural VS orthostatic hypotension, Bed/chair exit alarm, Evaluate medications/consider consulting pharmacy, Patient to call before getting OOB, Teach patient to arise slowly, Utilize gait belt for transfers/ambulation    Elimination Interventions: Call light in reach, Patient to call for help with toileting needs              Problem: Patient Education: Go to Patient Education Activity  Goal: Patient/Family Education  Outcome: Progressing Towards Goal

## 2021-12-13 LAB
ANION GAP SERPL CALC-SCNC: 7 MMOL/L (ref 7–16)
BASOPHILS # BLD: 0 K/UL (ref 0–0.2)
BASOPHILS NFR BLD: 1 % (ref 0–2)
BUN SERPL-MCNC: 15 MG/DL (ref 6–23)
CALCIUM SERPL-MCNC: 8.5 MG/DL (ref 8.3–10.4)
CHLORIDE SERPL-SCNC: 107 MMOL/L (ref 98–107)
CO2 SERPL-SCNC: 26 MMOL/L (ref 21–32)
CREAT SERPL-MCNC: 0.84 MG/DL (ref 0.6–1)
DIFFERENTIAL METHOD BLD: ABNORMAL
EOSINOPHIL # BLD: 0.1 K/UL (ref 0–0.8)
EOSINOPHIL NFR BLD: 1 % (ref 0.5–7.8)
ERYTHROCYTE [DISTWIDTH] IN BLOOD BY AUTOMATED COUNT: 13.9 % (ref 11.9–14.6)
GLUCOSE BLD STRIP.AUTO-MCNC: 214 MG/DL (ref 65–100)
GLUCOSE BLD STRIP.AUTO-MCNC: 259 MG/DL (ref 65–100)
GLUCOSE BLD STRIP.AUTO-MCNC: 353 MG/DL (ref 65–100)
GLUCOSE BLD STRIP.AUTO-MCNC: 362 MG/DL (ref 65–100)
GLUCOSE BLD STRIP.AUTO-MCNC: >600 MG/DL (ref 65–100)
GLUCOSE SERPL-MCNC: 292 MG/DL (ref 65–100)
HCT VFR BLD AUTO: 38.1 % (ref 35.8–46.3)
HGB BLD-MCNC: 12.4 G/DL (ref 11.7–15.4)
IMM GRANULOCYTES # BLD AUTO: 0 K/UL (ref 0–0.5)
IMM GRANULOCYTES NFR BLD AUTO: 0 % (ref 0–5)
LYMPHOCYTES # BLD: 3.1 K/UL (ref 0.5–4.6)
LYMPHOCYTES NFR BLD: 52 % (ref 13–44)
MAGNESIUM SERPL-MCNC: 2.1 MG/DL (ref 1.8–2.4)
MCH RBC QN AUTO: 30.6 PG (ref 26.1–32.9)
MCHC RBC AUTO-ENTMCNC: 32.5 G/DL (ref 31.4–35)
MCV RBC AUTO: 94.1 FL (ref 79.6–97.8)
MONOCYTES # BLD: 0.5 K/UL (ref 0.1–1.3)
MONOCYTES NFR BLD: 8 % (ref 4–12)
NEUTS SEG # BLD: 2.3 K/UL (ref 1.7–8.2)
NEUTS SEG NFR BLD: 38 % (ref 43–78)
NRBC # BLD: 0 K/UL (ref 0–0.2)
PLATELET # BLD AUTO: 210 K/UL (ref 150–450)
PMV BLD AUTO: 12.6 FL (ref 9.4–12.3)
POTASSIUM SERPL-SCNC: 3.2 MMOL/L (ref 3.5–5.1)
RBC # BLD AUTO: 4.05 M/UL (ref 4.05–5.2)
SERVICE CMNT-IMP: ABNORMAL
SODIUM SERPL-SCNC: 140 MMOL/L (ref 136–145)
WBC # BLD AUTO: 6 K/UL (ref 4.3–11.1)

## 2021-12-13 PROCEDURE — 83735 ASSAY OF MAGNESIUM: CPT

## 2021-12-13 PROCEDURE — 74011250637 HC RX REV CODE- 250/637: Performed by: FAMILY MEDICINE

## 2021-12-13 PROCEDURE — 80048 BASIC METABOLIC PNL TOTAL CA: CPT

## 2021-12-13 PROCEDURE — 74011636637 HC RX REV CODE- 636/637: Performed by: STUDENT IN AN ORGANIZED HEALTH CARE EDUCATION/TRAINING PROGRAM

## 2021-12-13 PROCEDURE — 74011250637 HC RX REV CODE- 250/637: Performed by: STUDENT IN AN ORGANIZED HEALTH CARE EDUCATION/TRAINING PROGRAM

## 2021-12-13 PROCEDURE — 65270000029 HC RM PRIVATE

## 2021-12-13 PROCEDURE — 36415 COLL VENOUS BLD VENIPUNCTURE: CPT

## 2021-12-13 PROCEDURE — 85025 COMPLETE CBC W/AUTO DIFF WBC: CPT

## 2021-12-13 PROCEDURE — 82962 GLUCOSE BLOOD TEST: CPT

## 2021-12-13 RX ORDER — INSULIN LISPRO 100 [IU]/ML
5 INJECTION, SOLUTION INTRAVENOUS; SUBCUTANEOUS
Status: DISCONTINUED | OUTPATIENT
Start: 2021-12-13 | End: 2021-12-14 | Stop reason: HOSPADM

## 2021-12-13 RX ORDER — DEXTROSE 50 % IN WATER (D50W) INTRAVENOUS SYRINGE
25-50 AS NEEDED
Status: DISCONTINUED | OUTPATIENT
Start: 2021-12-13 | End: 2021-12-14 | Stop reason: HOSPADM

## 2021-12-13 RX ORDER — INSULIN GLARGINE 100 [IU]/ML
30 INJECTION, SOLUTION SUBCUTANEOUS 2 TIMES DAILY
Status: DISCONTINUED | OUTPATIENT
Start: 2021-12-13 | End: 2021-12-13

## 2021-12-13 RX ORDER — DEXTROSE 40 %
15 GEL (GRAM) ORAL AS NEEDED
Status: DISCONTINUED | OUTPATIENT
Start: 2021-12-13 | End: 2021-12-14 | Stop reason: HOSPADM

## 2021-12-13 RX ORDER — INSULIN GLARGINE 100 [IU]/ML
60 INJECTION, SOLUTION SUBCUTANEOUS DAILY
Status: DISCONTINUED | OUTPATIENT
Start: 2021-12-14 | End: 2021-12-14 | Stop reason: HOSPADM

## 2021-12-13 RX ORDER — INSULIN GLARGINE 100 [IU]/ML
34 INJECTION, SOLUTION SUBCUTANEOUS DAILY
Status: DISCONTINUED | OUTPATIENT
Start: 2021-12-14 | End: 2021-12-13

## 2021-12-13 RX ADMIN — HYDROCODONE BITARTRATE AND HOMATROPINE METHYLBROMIDE 5 ML: 5; 1.5 SOLUTION ORAL at 05:50

## 2021-12-13 RX ADMIN — POLYETHYLENE GLYCOL 3350 17 G: 17 POWDER, FOR SOLUTION ORAL at 21:51

## 2021-12-13 RX ADMIN — INSULIN LISPRO 4 UNITS: 100 INJECTION, SOLUTION INTRAVENOUS; SUBCUTANEOUS at 22:00

## 2021-12-13 RX ADMIN — HYDROCODONE BITARTRATE AND HOMATROPINE METHYLBROMIDE 5 ML: 5; 1.5 SOLUTION ORAL at 21:51

## 2021-12-13 RX ADMIN — INSULIN LISPRO 10 UNITS: 100 INJECTION, SOLUTION INTRAVENOUS; SUBCUTANEOUS at 13:38

## 2021-12-13 RX ADMIN — Medication 10 ML: at 05:40

## 2021-12-13 RX ADMIN — INSULIN GLARGINE 30 UNITS: 100 INJECTION, SOLUTION SUBCUTANEOUS at 13:38

## 2021-12-13 RX ADMIN — Medication 10 ML: at 21:56

## 2021-12-13 RX ADMIN — Medication 10 ML: at 13:39

## 2021-12-13 RX ADMIN — INSULIN LISPRO 5 UNITS: 100 INJECTION, SOLUTION INTRAVENOUS; SUBCUTANEOUS at 17:03

## 2021-12-13 RX ADMIN — INSULIN LISPRO 8 UNITS: 100 INJECTION, SOLUTION INTRAVENOUS; SUBCUTANEOUS at 09:01

## 2021-12-13 RX ADMIN — INSULIN GLARGINE 30 UNITS: 100 INJECTION, SOLUTION SUBCUTANEOUS at 09:01

## 2021-12-13 RX ADMIN — INSULIN LISPRO 5 UNITS: 100 INJECTION, SOLUTION INTRAVENOUS; SUBCUTANEOUS at 13:38

## 2021-12-13 RX ADMIN — INSULIN LISPRO 10 UNITS: 100 INJECTION, SOLUTION INTRAVENOUS; SUBCUTANEOUS at 17:04

## 2021-12-13 RX ADMIN — POLYETHYLENE GLYCOL 3350 17 G: 17 POWDER, FOR SOLUTION ORAL at 08:59

## 2021-12-13 NOTE — DIABETES MGMT
Patient admitted 12/10/21 with DKA. Blood glucose on admission 811. Pt was initially started on an insulin drip and then transitioned to subcutaneous insulin injections. . HbA1c 12.8%. Pt states that she was diagnosed with DM one week prior to admission and started on Metformin. Patient given educational material, \"Diabetes Self-Management: A Patient Teaching Guide\", which was reviewed with patient. Explained basic physiology of diabetes, as well as causes, signs and symptoms, and treatments for hypoglycemia and hyperglycemia. Described the effects of poor glycemic control and the development of long-term complications such as renal, eye, nerve, and cardiovascular disease. Patient denies numbness and tingling in feet. Described proper diabetic foot care and the importance of checking feet daily. Reviewed effects of sweetened beverages on glycemic control and discussed alternative beverages to help improve glycemic control. Educated re: effects of carbohydrates on blood glucose, the \"plate method\" of healthy meal planning, basics of healthy meal plan, Consistent Carbohydrate Diet, discussed the basics of carb counting and how to read a nutrition label. Educated patient regarding the benefits of physical activity (as cleared by provider) on glycemic control. Also explained the relationship between hyperglycemia and infection and delayed healing. Discussed target goals for blood glucose and A1C and the significance of an HbA1c of 12.8%. Educated patient regarding diabetic medications including mechanism of action, timing, and possible side effects. Patient verbalizes understanding of teaching. Explained the importance of blood glucose monitoring to patient and how this will provide their primary care provider with more information to help them safely titrate their regimen. Recommended frequency of qid, and to record in log book to take to primary care provider appointment.  Demonstrated how to use a blood glucose meter, care of strips, and sharps disposal. Educated patient that all glucometers are similar but differ in small ways. Educated patient that they should try to get the glucometer covered by their insurance formulary to keep their costs down. Educated patient to seek out affordable glucometer options that exist at some stores or drug stores if they are ever uninsured. Patient was able to return demonstrate correct use of meter. Patient will need prescription for glucometer kit, test strips, and lancets at discharge so that the patient may obtain the meter covered by their insurance. Educated patient regarding current basal/bolus regimen of Lantus 60 daily, Humalog 5 units 3x/day ac, and Humalog correctional scale coverage 4x/day ac and hs, including type of insulin, timing with meals, onset, duration of effect, and peak of insulin dose. Educated patient on the differences between prandial insulin and correctional insulin. Instructed patient to always seek guidance from their primary care provider about adjusting their insulin doses and not to adjust them on their own as this could negatively impact their glycemic control or result in hypoglycemia. Patient verbalizes understanding. Patient instructed on the preparation and injection of insulin dose via vial and syringe method. Nursing will continue to have patient practice insulin self-injection when insulin dose is due and document progress or refusals of insulin practice in progress notes. Patient educated regarding insulin administration sites. Patient also educated regarding the importance of site rotation, proper storage of insulin, and proper sharps disposal. Patient was also instructed in proper use of insulin pens. Patient was able to return demonstrate proper use of insulin pen using injection model and saline demo pen. Patient prefers insulin pens. Patient will need prescription for insulin pens and pen needles at discharge.   Stressed the importance of follow up care for diabetes management with PCP. Pt would likely benefit from further diabetes education. Referral has been initiated to [facility] and pt is agreeable with plan. Pt has no further questions regarding diabetes management at this time.

## 2021-12-13 NOTE — PROGRESS NOTES
Hospitalist Progress Note   Admit Date:  2021  3:30 AM   Name:  Radha Friday   Age:  48 y.o. Sex:  female  :  1975   MRN:  [id number]   Room:  [facility]/    Presenting Complaint: No chief complaint on file. Reason(s) for Admission: DKA, type 1 [facility]) [E10.10]     Hospital Course & Interval History:   Patient was admitted today  for DKA    Subjective (21): Patient was seen and examined at the bedside. Feeling better today. Patient with out any complaints. Patient denied any cardiac chest pain, shortness of breath, abdominal pain, any neurologic deficit. Glucose levels still elevated. Will meet with diabetic educator today. Assessment & Plan:   DKA  Patient was started on insulin drip on admission  Anion gap closed  Will continue to monitor glucose daily  Patient also on sliding scale at this time  Diabetes management consulted  A1c of 12.8  Patient would likely benefit from seeing endocrinology outpatient for further management of her diabetes. C-peptide low   patient's blood glucose still elevated in the 300s, will adjust insulin   increase Lantus to 60 units daily and Humalog 5 units before meals. Continue sliding scale insulin  Diabetes educator met with patient and discussed goals and treatment plan. Dispo  Patient likely to be discharged next 24 hours. Patient will need prescription for a glucometer kit, test strips, lancets, insulin pen and pen needles.     Diet:  ADULT DIET Regular; 3 carb choices (45 gm/meal)  DVT PPx: Lovenox  Code status: Full Code    Hospital Problems as of 2021 Never Reviewed          Codes Class Noted - Resolved POA    * (Principal) DKA, type 2 ([facility] [address] ICD-10-CM: E11.10  ICD-9-CM: 250.12  2021 - Present Yes        Essential hypertension (Chronic) ICD-10-CM: I10  ICD-9-CM: 401.9  2018 - Present Yes    Overview Signed 2021  4:17 AM by Marisel Mott MD     Last Assessment & Plan:   Formatting of this note might be different from the original.  Stable,no change in therapy;Continue present management                   Objective:     Patient Vitals for the past 24 hrs:   Temp Pulse Resp BP SpO2   12/13/21 1153 98 °F (36.7 °C) 84 18 124/83 99 %   12/13/21 0833 98.6 °F (37 °C) 70 18 131/84 100 %   12/13/21 0351 98.2 °F (36.8 °C) 68 16 132/84 97 %   12/13/21 0002 98.1 °F (36.7 °C) 70 14 138/84 100 %   12/12/21 1925 98.4 °F (36.9 °C) 77 16 (!) 145/83 99 %     Oxygen Therapy  O2 Sat (%): 99 % (12/13/21 1153)  Pulse via Oximetry: 69 beats per minute (12/12/21 1319)  O2 Device: None (Room air) (12/12/21 1525)    Estimated body mass index is 38.04 kg/m² as calculated from the following:    Height as of 12/10/21: 5' 8\" (1.727 m). Weight as of this encounter: 113.5 kg (250 lb 3.2 oz). Intake/Output Summary (Last 24 hours) at 12/13/2021 1526  Last data filed at 12/13/2021 0901  Gross per 24 hour   Intake 600 ml   Output    Net 600 ml         Physical Exam:   Blood pressure 124/83, pulse 84, temperature 98 °F (36.7 °C), resp. rate 18, weight 113.5 kg (250 lb 3.2 oz), SpO2 99 %. General:    Well nourished. No overt distress  Head:  Normocephalic, atraumatic  Eyes:  Sclerae appear normal.  Pupils equally round. ENT:  Nares appear normal, no drainage. Moist oral mucosa  Neck:  No restricted ROM. Trachea midline   CV:   RRR. No m/r/g. No jugular venous distension. Lungs:   CTAB. No wheezing, rhonchi, or rales. Respirations even, unlabored  Abdomen: Bowel sounds present. Soft, nontender, nondistended. Extremities: No cyanosis or clubbing. No edema  Skin:     No rashes and normal coloration. Warm and dry. Neuro:  CN II-XII grossly intact. Sensation intact. A&Ox3  Psych:  Normal mood and affect.       I have reviewed ordered lab tests and independently visualized imaging below:    Recent Labs:  Recent Results (from the past 48 hour(s))   GLUCOSE, POC    Collection Time: 12/11/21  4:00 PM   Result Value Ref Range    Glucose (POC) 178 (H) 65 - 100 mg/dL    Performed by [facility]    METABOLIC PANEL, BASIC    Collection Time: 12/11/21  5:24 PM   Result Value Ref Range    Sodium 149 (H) 136 - 145 mmol/L    Potassium 4.1 3.5 - 5.1 mmol/L    Chloride 117 (H) 98 - 107 mmol/L    CO2 29 21 - 32 mmol/L    Anion gap 3 (L) 7 - 16 mmol/L    Glucose 208 (H) 65 - 100 mg/dL    BUN 27 (H) 6 - 23 MG/DL    Creatinine 1.31 (H) 0.6 - 1.0 MG/DL    GFR est AA 56 (L) >60 ml/min/1.73m2    GFR est non-AA 46 (L) >60 ml/min/1.73m2    Calcium 8.7 8.3 - 10.4 MG/DL   MAGNESIUM    Collection Time: 12/11/21  5:24 PM   Result Value Ref Range    Magnesium 2.8 (H) 1.8 - 2.4 mg/dL   GLUCOSE, POC    Collection Time: 12/11/21  9:26 PM   Result Value Ref Range    Glucose (POC) 345 (H) 65 - 100 mg/dL    Performed by Amy Flores, BASIC    Collection Time: 12/12/21  3:05 AM   Result Value Ref Range    Sodium 145 136 - 145 mmol/L    Potassium 3.8 3.5 - 5.1 mmol/L    Chloride 114 (H) 98 - 107 mmol/L    CO2 26 21 - 32 mmol/L    Anion gap 5 (L) 7 - 16 mmol/L    Glucose 331 (H) 65 - 100 mg/dL    BUN 25 (H) 6 - 23 MG/DL    Creatinine 1.26 (H) 0.6 - 1.0 MG/DL    GFR est AA 58 (L) >60 ml/min/1.73m2    GFR est non-AA 48 (L) >60 ml/min/1.73m2    Calcium 8.8 8.3 - 10.4 MG/DL   MAGNESIUM    Collection Time: 12/12/21  3:05 AM   Result Value Ref Range    Magnesium 2.5 (H) 1.8 - 2.4 mg/dL   CBC WITH AUTOMATED DIFF    Collection Time: 12/12/21  3:05 AM   Result Value Ref Range    WBC 7.5 4.3 - 11.1 K/uL    RBC 4.29 4.05 - 5.2 M/uL    HGB 13.1 11.7 - 15.4 g/dL    HCT 41.2 35.8 - 46.3 %    MCV 96.0 79.6 - 97.8 FL    MCH 30.5 26.1 - 32.9 PG    MCHC 31.8 31.4 - 35.0 g/dL    RDW 14.3 11.9 - 14.6 %    PLATELET 094 595 - 337 K/uL    MPV 11.8 9.4 - 12.3 FL    ABSOLUTE NRBC 0.00 0.0 - 0.2 K/uL    DF AUTOMATED      NEUTROPHILS 53 43 - 78 %    LYMPHOCYTES 36 13 - 44 %    MONOCYTES 10 4.0 - 12.0 %    EOSINOPHILS 1 0.5 - 7.8 %    BASOPHILS 1 0.0 - 2.0 %    IMMATURE GRANULOCYTES 0 0.0 - 5.0 %    ABS. NEUTROPHILS 4.0 1.7 - 8.2 K/UL    ABS. LYMPHOCYTES 2.7 0.5 - 4.6 K/UL    ABS. MONOCYTES 0.7 0.1 - 1.3 K/UL    ABS. EOSINOPHILS 0.1 0.0 - 0.8 K/UL    ABS. BASOPHILS 0.1 0.0 - 0.2 K/UL    ABS. IMM. GRANS. 0.0 0.0 - 0.5 K/UL   HEPATIC FUNCTION PANEL    Collection Time: 12/12/21  3:05 AM   Result Value Ref Range    Protein, total 7.4 6.3 - 8.2 g/dL    Albumin 3.0 (L) 3.5 - 5.0 g/dL    Globulin 4.4 (H) 2.3 - 3.5 g/dL    A-G Ratio 0.7 (L) 1.2 - 3.5      Bilirubin, total 0.5 0.2 - 1.1 MG/DL    Bilirubin, direct 0.2 <0.4 MG/DL    Alk.  phosphatase 102 50 - 136 U/L    AST (SGOT) 12 (L) 15 - 37 U/L    ALT (SGPT) 20 12 - 65 U/L   GLUCOSE, POC    Collection Time: 12/12/21  7:38 AM   Result Value Ref Range    Glucose (POC) 292 (H) 65 - 100 mg/dL    Performed by Gabriel    GLUCOSE, POC    Collection Time: 12/12/21 11:30 AM   Result Value Ref Range    Glucose (POC) 382 (H) 65 - 100 mg/dL    Performed by Gabriel    GLUCOSE, POC    Collection Time: 12/12/21  4:44 PM   Result Value Ref Range    Glucose (POC) 318 (H) 65 - 100 mg/dL    Performed by Sebastian    GLUCOSE, POC    Collection Time: 12/12/21  9:30 PM   Result Value Ref Range    Glucose (POC) 341 (H) 65 - 100 mg/dL    Performed by Tammie    METABOLIC PANEL, BASIC    Collection Time: 12/13/21  6:57 AM   Result Value Ref Range    Sodium 140 136 - 145 mmol/L    Potassium 3.2 (L) 3.5 - 5.1 mmol/L    Chloride 107 98 - 107 mmol/L    CO2 26 21 - 32 mmol/L    Anion gap 7 7 - 16 mmol/L    Glucose 292 (H) 65 - 100 mg/dL    BUN 15 6 - 23 MG/DL    Creatinine 0.84 0.6 - 1.0 MG/DL    GFR est AA >60 >60 ml/min/1.73m2    GFR est non-AA >60 >60 ml/min/1.73m2    Calcium 8.5 8.3 - 10.4 MG/DL   CBC WITH AUTOMATED DIFF    Collection Time: 12/13/21  6:57 AM   Result Value Ref Range    WBC 6.0 4.3 - 11.1 K/uL    RBC 4.05 4.05 - 5.2 M/uL    HGB 12.4 11.7 - 15.4 g/dL    HCT 38.1 35.8 - 46.3 %    MCV 94.1 79.6 - 97.8 FL    MCH 30.6 26.1 - 32.9 PG    MCHC 32.5 31.4 - 35.0 g/dL    RDW 13.9 11.9 - 14.6 %    PLATELET 231 628 - 138 K/uL    MPV 12.6 (H) 9.4 - 12.3 FL    ABSOLUTE NRBC 0.00 0.0 - 0.2 K/uL    DF AUTOMATED      NEUTROPHILS 38 (L) 43 - 78 %    LYMPHOCYTES 52 (H) 13 - 44 %    MONOCYTES 8 4.0 - 12.0 %    EOSINOPHILS 1 0.5 - 7.8 %    BASOPHILS 1 0.0 - 2.0 %    IMMATURE GRANULOCYTES 0 0.0 - 5.0 %    ABS. NEUTROPHILS 2.3 1.7 - 8.2 K/UL    ABS. LYMPHOCYTES 3.1 0.5 - 4.6 K/UL    ABS. MONOCYTES 0.5 0.1 - 1.3 K/UL    ABS. EOSINOPHILS 0.1 0.0 - 0.8 K/UL    ABS. BASOPHILS 0.0 0.0 - 0.2 K/UL    ABS. IMM. GRANS. 0.0 0.0 - 0.5 K/UL   MAGNESIUM    Collection Time: 12/13/21  6:57 AM   Result Value Ref Range    Magnesium 2.1 1.8 - 2.4 mg/dL   GLUCOSE, POC    Collection Time: 12/13/21  8:06 AM   Result Value Ref Range    Glucose (POC) 259 (H) 65 - 100 mg/dL    [facility]    GLUCOSE, POC    Collection Time: 12/13/21 11:05 AM   Result Value Ref Range    Glucose (POC) 353 (H) 65 - 100 mg/dL    [facility] by [facility]        All Micro Results     None          Other Studies:  No results found.     Current Meds:  Current Facility-Administered Medications   Medication Dose Route Frequency    insulin lispro (HUMALOG) injection 5 Units  5 Units SubCUTAneous TIDAC    dextrose 40% (GLUTOSE) oral gel 1 Tube  15 g Oral PRN    glucagon (GLUCAGEN) injection 1 mg  1 mg IntraMUSCular PRN    dextrose (D50W) injection syrg 12.5-25 g  25-50 mL IntraVENous PRN    [START ON 12/14/2021] insulin glargine (LANTUS) injection 60 Units  60 Units SubCUTAneous DAILY    polyethylene glycol (MIRALAX) packet 17 g  17 g Oral TID    sodium chloride (NS) flush 5-40 mL  5-40 mL IntraVENous Q8H    sodium chloride (NS) flush 5-40 mL  5-40 mL IntraVENous PRN    acetaminophen (TYLENOL) tablet 650 mg  650 mg Oral Q6H PRN    Or    acetaminophen (TYLENOL) suppository 650 mg  650 mg Rectal Q6H PRN    promethazine (PHENERGAN) tablet 12.5 mg 12.5 mg Oral Q6H PRN    Or    ondansetron (ZOFRAN) injection 4 mg  4 mg IntraVENous Q6H PRN    enoxaparin (LOVENOX) injection 40 mg  40 mg SubCUTAneous DAILY    influenza vaccine 2021-22 (6 mos+)(PF) (FLUARIX/FLULAVAL/FLUZONE QUAD) injection 0.5 mL  1 Each IntraMUSCular PRIOR TO DISCHARGE    insulin lispro (HUMALOG) injection   SubCUTAneous AC&HS    HYDROcodone-homatropine (HYCODAN) 5-1.5 mg/5 mL (5 mL) oral solution 5 mL  5 mL Oral Q4H PRN    guaiFENesin (ROBITUSSIN) 100 mg/5 mL oral liquid 100 mg  100 mg Oral Q4H PRN       Signed:  Bill Emery MD    Part of this note may have been written by using a voice dictation software. The note has been proof read but may still contain some grammatical/other typographical errors.

## 2021-12-13 NOTE — PROGRESS NOTES
END OF SHIFT NOTE:    Intake/Output  No intake/output data recorded. Voiding: YES  Catheter: NO  Drain:              Stool:  0 occurrences. Stool Assessment  Stool Appearance: Other (comment) (not observed) (12/12/21 0730)    Emesis:  0 occurrences. VITAL SIGNS  Patient Vitals for the past 12 hrs:   Temp Pulse Resp BP SpO2   12/12/21 1525 98.3 °F (36.8 °C) 64 16 115/69 94 %   12/12/21 1502    123/82    12/12/21 1400 98.2 °F (36.8 °C) 76 16 (!) 120/104 96 %   12/12/21 1319  68 16  94 %   12/12/21 1106 98 °F (36.7 °C) 70 16 126/75 97 %   12/12/21 0756  67 16 137/77 99 %       Pain Assessment  Pain 1  Pain Scale 1: Numeric (0 - 10) (12/12/21 0712)  Pain Intensity 1: 0 (12/12/21 0712)  Patient Stated Pain Goal: 0 (12/12/21 6945)  Pain Reassessment 1: Yes (12/11/21 2025)  Pain Onset 1: 10 mins ago (12/11/21 1923)  Pain Location 1: Head (12/11/21 1923)  Pain Description 1: Throbbing (12/11/21 1923)  Pain Intervention(s) 1: Medication (see MAR) (12/11/21 1923)    Ambulating  Yes    Additional Information:     Shift report given to oncoming nurse at the bedside.     Lynsey Carlos RN

## 2021-12-14 VITALS
WEIGHT: 251.1 LBS | BODY MASS INDEX: 38.18 KG/M2 | DIASTOLIC BLOOD PRESSURE: 90 MMHG | RESPIRATION RATE: 16 BRPM | HEART RATE: 84 BPM | SYSTOLIC BLOOD PRESSURE: 122 MMHG | TEMPERATURE: 98.5 F | OXYGEN SATURATION: 95 %

## 2021-12-14 LAB
ALBUMIN SERPL-MCNC: 2.9 G/DL (ref 3.5–5)
ALBUMIN/GLOB SERPL: 0.7 {RATIO} (ref 1.2–3.5)
ALP SERPL-CCNC: 100 U/L (ref 50–136)
ALT SERPL-CCNC: 22 U/L (ref 12–65)
ANION GAP SERPL CALC-SCNC: 6 MMOL/L (ref 7–16)
AST SERPL-CCNC: 16 U/L (ref 15–37)
BASOPHILS # BLD: 0 K/UL (ref 0–0.2)
BASOPHILS NFR BLD: 1 % (ref 0–2)
BILIRUB SERPL-MCNC: 0.5 MG/DL (ref 0.2–1.1)
BUN SERPL-MCNC: 11 MG/DL (ref 6–23)
CALCIUM SERPL-MCNC: 8.7 MG/DL (ref 8.3–10.4)
CHLORIDE SERPL-SCNC: 107 MMOL/L (ref 98–107)
CO2 SERPL-SCNC: 28 MMOL/L (ref 21–32)
CREAT SERPL-MCNC: 0.75 MG/DL (ref 0.6–1)
DIFFERENTIAL METHOD BLD: ABNORMAL
EOSINOPHIL # BLD: 0.1 K/UL (ref 0–0.8)
EOSINOPHIL NFR BLD: 2 % (ref 0.5–7.8)
ERYTHROCYTE [DISTWIDTH] IN BLOOD BY AUTOMATED COUNT: 14.1 % (ref 11.9–14.6)
GLOBULIN SER CALC-MCNC: 4.1 G/DL (ref 2.3–3.5)
GLUCOSE BLD STRIP.AUTO-MCNC: 166 MG/DL (ref 65–100)
GLUCOSE BLD STRIP.AUTO-MCNC: 200 MG/DL (ref 65–100)
GLUCOSE SERPL-MCNC: 181 MG/DL (ref 65–100)
HCT VFR BLD AUTO: 40.5 % (ref 35.8–46.3)
HGB BLD-MCNC: 13 G/DL (ref 11.7–15.4)
IMM GRANULOCYTES # BLD AUTO: 0 K/UL (ref 0–0.5)
IMM GRANULOCYTES NFR BLD AUTO: 0 % (ref 0–5)
LYMPHOCYTES # BLD: 2.7 K/UL (ref 0.5–4.6)
LYMPHOCYTES NFR BLD: 52 % (ref 13–44)
MAGNESIUM SERPL-MCNC: 2.2 MG/DL (ref 1.8–2.4)
MCH RBC QN AUTO: 30.7 PG (ref 26.1–32.9)
MCHC RBC AUTO-ENTMCNC: 32.1 G/DL (ref 31.4–35)
MCV RBC AUTO: 95.7 FL (ref 79.6–97.8)
MONOCYTES # BLD: 0.4 K/UL (ref 0.1–1.3)
MONOCYTES NFR BLD: 8 % (ref 4–12)
NEUTS SEG # BLD: 2 K/UL (ref 1.7–8.2)
NEUTS SEG NFR BLD: 38 % (ref 43–78)
NRBC # BLD: 0 K/UL (ref 0–0.2)
PLATELET # BLD AUTO: 195 K/UL (ref 150–450)
PMV BLD AUTO: 12.5 FL (ref 9.4–12.3)
POTASSIUM SERPL-SCNC: 3.1 MMOL/L (ref 3.5–5.1)
PROT SERPL-MCNC: 7 G/DL (ref 6.3–8.2)
RBC # BLD AUTO: 4.23 M/UL (ref 4.05–5.2)
SERVICE CMNT-IMP: ABNORMAL
SERVICE CMNT-IMP: ABNORMAL
SODIUM SERPL-SCNC: 141 MMOL/L (ref 136–145)
WBC # BLD AUTO: 5.3 K/UL (ref 4.3–11.1)

## 2021-12-14 PROCEDURE — 85027 COMPLETE CBC AUTOMATED: CPT

## 2021-12-14 PROCEDURE — 74011250636 HC RX REV CODE- 250/636: Performed by: FAMILY MEDICINE

## 2021-12-14 PROCEDURE — 82962 GLUCOSE BLOOD TEST: CPT

## 2021-12-14 PROCEDURE — 80053 COMPREHEN METABOLIC PANEL: CPT

## 2021-12-14 PROCEDURE — 36415 COLL VENOUS BLD VENIPUNCTURE: CPT

## 2021-12-14 PROCEDURE — 90686 IIV4 VACC NO PRSV 0.5 ML IM: CPT | Performed by: FAMILY MEDICINE

## 2021-12-14 PROCEDURE — 83735 ASSAY OF MAGNESIUM: CPT

## 2021-12-14 PROCEDURE — 90471 IMMUNIZATION ADMIN: CPT

## 2021-12-14 PROCEDURE — 74011636637 HC RX REV CODE- 636/637: Performed by: STUDENT IN AN ORGANIZED HEALTH CARE EDUCATION/TRAINING PROGRAM

## 2021-12-14 RX ORDER — LANCETS
EACH MISCELLANEOUS
Qty: 1 EACH | Refills: 11 | Status: SHIPPED | OUTPATIENT
Start: 2021-12-14

## 2021-12-14 RX ORDER — INSULIN GLARGINE 100 [IU]/ML
INJECTION, SOLUTION SUBCUTANEOUS
Qty: 2 PEN | Refills: 0 | Status: SHIPPED | OUTPATIENT
Start: 2021-12-14

## 2021-12-14 RX ORDER — IBUPROFEN 200 MG
CAPSULE ORAL
Qty: 90 STRIP | Refills: 1 | Status: SHIPPED | OUTPATIENT
Start: 2021-12-14

## 2021-12-14 RX ORDER — INSULIN PUMP SYRINGE, 3 ML
EACH MISCELLANEOUS
Qty: 1 KIT | Refills: 0 | Status: SHIPPED | OUTPATIENT
Start: 2021-12-14

## 2021-12-14 RX ORDER — INSULIN LISPRO 100 [IU]/ML
5 INJECTION, SOLUTION INTRAVENOUS; SUBCUTANEOUS
Qty: 4.5 ML | Refills: 0 | Status: SHIPPED | OUTPATIENT
Start: 2021-12-14 | End: 2022-01-13

## 2021-12-14 RX ADMIN — INSULIN LISPRO 4 UNITS: 100 INJECTION, SOLUTION INTRAVENOUS; SUBCUTANEOUS at 12:01

## 2021-12-14 RX ADMIN — INSULIN LISPRO 5 UNITS: 100 INJECTION, SOLUTION INTRAVENOUS; SUBCUTANEOUS at 12:00

## 2021-12-14 RX ADMIN — INFLUENZA VIRUS VACCINE 0.5 ML: 15; 15; 15; 15 SUSPENSION INTRAMUSCULAR at 12:21

## 2021-12-14 RX ADMIN — INSULIN GLARGINE 60 UNITS: 100 INJECTION, SOLUTION SUBCUTANEOUS at 09:26

## 2021-12-14 RX ADMIN — Medication 10 ML: at 06:33

## 2021-12-14 RX ADMIN — INSULIN LISPRO 2 UNITS: 100 INJECTION, SOLUTION INTRAVENOUS; SUBCUTANEOUS at 09:21

## 2021-12-14 RX ADMIN — INSULIN LISPRO 5 UNITS: 100 INJECTION, SOLUTION INTRAVENOUS; SUBCUTANEOUS at 09:21

## 2021-12-14 RX ADMIN — ENOXAPARIN SODIUM 40 MG: 100 INJECTION SUBCUTANEOUS at 09:19

## 2021-12-14 NOTE — PROGRESS NOTES
Discharge instructions complete medications and prescriptions reviewed. Pt aware of time and date of follow up with PCP Eneida Oseguera MD tomorrow at [phone] and plans to schedule an endocrinologist at that appointment. Pt education completed but needs reenforcement. Pt d/c with  home via car and rolled out via wheel chair. IV's have been removed. Pt stated they were no longer allergic to PCN. Pt has no further questions at this time.

## 2021-12-14 NOTE — DISCHARGE SUMMARY
Hospitalist Discharge Summary   Admit Date:  2021  3:30 AM   DC Note date: 2021  Name:  Flor Orellana   Age:  48 y.o. Sex:  female  :  1975   MRN:  [id number]   Room:  [facility]  PCP:  None    Presenting Complaint: No chief complaint on file. Initial Admission Diagnosis: DKA, type 1 ([address] [E10.10]     Problem List for this Hospitalization:  Hospital Problems as of 2021 Never Reviewed          Codes Class Noted - Resolved POA    * (Principal) DKA, type 2 ([address] ICD-10-CM: E11.10  ICD-9-CM: 250.12  2021 - Present Yes        Essential hypertension (Chronic) ICD-10-CM: I10  ICD-9-CM: 401.9  2018 - Present Yes    Overview Signed 2021  4:17 AM by Abran Cavazos MD     Last Assessment & Plan:   Formatting of this note might be different from the original.  Stable,no change in therapy;Continue present management                 Did Patient have Sepsis (YES OR NO): No    Hospital Course: \"Rebecca Muñiz is a 48 y.o. female with medical history of newly diagnosed diabetes, who presented with nausea, vomiting, and dehydration. She additionally reports productive cough with yellow sputum, runny nose, dizziness. She states she did have diarrhea last week, however now feels constipated. She denies fever/chills, abdominal pain, chest pain, shortness of breath. She does report recently being diagnosed with diabetes, presumably type II. She was prescribed Metformin, states she is really been too sick to keep it down. She has not yet received a glucometer and test strips. She states her symptoms started about 10 days ago. She has been noting polyuria and polydipsia for an even longer period of time, which is what led to testing and diagnosis of her diabetes. Triage noted her blood sugar should read \"high\". Lab testing showed this to be over 800. \"  Patient was started on insulin drip on admission for DKA.   By next day anion gap had closed and patient was placed on sliding scale and Lantus. Diabetes management consulted. Patient had an A1c of 12.8. Patient had just recently seen her primary care provider and stated that her A1c was elevated but was only placed on Metformin at the time. Patient would likely benefit from seeing endocrinology outpatient for further management of her diabetes. C-peptide low. Patient still continued to have high sugars despite being on doses of Lantus and Humalog with sliding scale. Lantus and Humalog dosages were changed and patient was able to have better control of her glucose levels. Diabetes educator met with patient to discuss goals and treatment plan. Patient will be going home with Lantus and Humalog to better control her diabetes, she will also be going home with Metformin. Patient is expected to follow-up with her primary care provider tomorrow 12/15 to discuss this recent hospitalization and any tailoring of her diabetes medications. Patient advised that she would like to have freestyle lei for easier management and she was advised to follow-up with a endocrinologist who can advise on treatment plan for her lifestyle. On day of discharge patient was stable. Patient denied any cardiac chest pain, shortness of breath, abdominal pain. Disposition: Home or Self Care  Diet: ADULT DIET Regular; 3 carb choices (45 gm/meal)  Code Status: Full Code    Follow Up Orders: Follow-up Appointments   Procedures    FOLLOW UP VISIT Appointment in: 3 - 5 Days Follow-up with your primary care provider within the next 3 to 5 days to discuss recent hospitalization and changes made to your diabetes medication. I would recommend following up with an endocrinologist... Follow-up with your primary care provider within the next 3 to 5 days to discuss recent hospitalization and changes made to your diabetes medication. I would recommend following up with an endocrinologist for better management of your diabetes.      Standing Status: Standing     Number of Occurrences:   1     Order Specific Question:   Appointment in     Answer:   3 - 5 Days       Follow-up Information     Follow up With Specialties Details Why Contact Info    None    None ([phone]) Patient stated that they have no PCP            Follow up labs/diagnostics (ultimately defer to outpatient provider): Follow-up with PCP within next 3 to 5 days cussed recent hospitalization any changes made to medication. Please follow-up with endocrinologist for further treatment plan for better management of diabetes. Time spent in patient discharge and coordination 40 minutes. Plan was discussed with patient. All questions answered. Patient was stable at time of discharge. Instructions given to call a physician or return if any concerns. Discharge Info:   Discharge Medication List as of 12/14/2021 12:34 PM      START taking these medications    Details   Blood-Glucose Meter monitoring kit Use to monitor blood glucose lvels, Normal, Disp-1 Kit, R-0      glucose blood VI test strips (blood glucose test) strip Use with meter to test glucose levels, Normal, Disp-90 Strip, R-1      lancets misc Use to meter to check glucose, Normal, Disp-1 Each, R-11      insulin glargine (LANTUS,BASAGLAR) 100 unit/mL (3 mL) inpn Please take 60 units of lantus daily in the morning, Normal, Disp-2 Pen, R-0      insulin lispro (HUMALOG) 100 unit/mL kwikpen 5 Units by SubCUTAneous route Before breakfast, lunch, and dinner for 30 days. , Normal, Disp-4.5 mL, R-0         CONTINUE these medications which have NOT CHANGED    Details   hydroCHLOROthiazide (HYDRODIURIL) 25 mg tablet Take 25 mg by mouth daily. Indications: high blood pressure, Historical Med      metFORMIN (GLUMETZA ER) 500 mg TG24 24 hour tablet Take  by mouth., Historical Med      meclizine (ANTIVERT) 25 mg tablet Take 1 Tab by mouth three (3) times daily as needed for Dizziness. Print, 25 mg, Disp-20 Tab, R-0             Procedures done this admission:  * No surgery found *    Consults this admission:  None    Echocardiogram/EKG results:  No results found for this or any previous visit. EKG Results     None          Diagnostic Imaging/Tests:   No results found.     All Micro Results     None          Labs: Results:       BMP, Mg, Phos Recent Labs     12/14/21 0627 12/13/21  0657 12/12/21  0305    140 145   K 3.1* 3.2* 3.8    107 114*   CO2 28 26 26   AGAP 6* 7 5*   BUN 11 15 25*   CREA 0.75 0.84 1.26*   CA 8.7 8.5 8.8   * 292* 331*   MG 2.2 2.1 2.5*      CBC Recent Labs     12/14/21 0627 12/13/21  0657 12/12/21  0305   WBC 5.3 6.0 7.5   RBC 4.23 4.05 4.29   HGB 13.0 12.4 13.1   HCT 40.5 38.1 41.2    210 243   GRANS 38* 38* 53   LYMPH 52* 52* 36   EOS 2 1 1   MONOS 8 8 10   BASOS 1 1 1   IG 0 0 0   ANEU 2.0 2.3 4.0   ABL 2.7 3.1 2.7   MARILUZ 0.1 0.1 0.1   ABM 0.4 0.5 0.7   ABB 0.0 0.0 0.1   AIG 0.0 0.0 0.0      LFT Recent Labs     12/14/21 0627 12/12/21  0305   ALT 22 20    102   TP 7.0 7.4   ALB 2.9* 3.0*   GLOB 4.1* 4.4*   AGRAT 0.7* 0.7*      Cardiac Testing No results found for: BNPP, BNP, CPK, RCK1, RCK2, RCK3, RCK4, CKMB, CKNDX, CKND1, TROPT, TROIQ   Coagulation Tests No results found for: PTP, INR, APTT, INREXT   A1c Lab Results   Component Value Date/Time    Hemoglobin A1c 12.8 (H) 12/11/2021 04:56 AM      Lipid Panel No results found for: CHOL, CHOLPOCT, CHOLX, CHLST, CHOLV, 067644, HDL, HDLP, LDL, LDLC, DLDLP, 428912, VLDLC, VLDL, TGLX, TRIGL, TRIGP, TGLPOCT, CHHD, CHHDX   Thyroid Panel No results found for: TSH, T4, FT4, TT3, T3U, TSHEXT     Most Recent UA No results found for: COLOR, APPRN, REFSG, YG, PROTU, GLUCU, KETU, BILU, BLDU, UROU, ARLEY, LEUKU, WBCU, RBCU, UEPI, BACTU, CASTS, UCRY, MUCUS, UCOM       All Labs from Last 24 Hrs:  Recent Results (from the past 24 hour(s))   GLUCOSE, POC    Collection Time: 12/13/21  4:11 PM   Result Value Ref Range    Glucose (POC) 362 (H) 65 - 100 mg/dL    Performed by [facility]    GLUCOSE, POC    Collection Time: 12/13/21  9:32 PM   Result Value Ref Range    Glucose (POC) 214 (H) 65 - 100 mg/dL    Performed by [facility]    CBC W/O DIFF    Collection Time: 12/14/21  6:27 AM   Result Value Ref Range    WBC 5.3 4.3 - 11.1 K/uL    RBC 4.23 4.05 - 5.2 M/uL    HGB 13.0 11.7 - 15.4 g/dL    HCT 40.5 35.8 - 46.3 %    MCV 95.7 79.6 - 97.8 FL    MCH 30.7 26.1 - 32.9 PG    MCHC 32.1 31.4 - 35.0 g/dL    RDW 14.1 11.9 - 14.6 %    PLATELET 627 655 - 438 K/uL    MPV 12.5 (H) 9.4 - 12.3 FL    ABSOLUTE NRBC 0.00 0.0 - 0.2 K/uL   METABOLIC PANEL, COMPREHENSIVE    Collection Time: 12/14/21  6:27 AM   Result Value Ref Range    Sodium 141 136 - 145 mmol/L    Potassium 3.1 (L) 3.5 - 5.1 mmol/L    Chloride 107 98 - 107 mmol/L    CO2 28 21 - 32 mmol/L    Anion gap 6 (L) 7 - 16 mmol/L    Glucose 181 (H) 65 - 100 mg/dL    BUN 11 6 - 23 MG/DL    Creatinine 0.75 0.6 - 1.0 MG/DL    GFR est AA >60 >60 ml/min/1.73m2    GFR est non-AA >60 >60 ml/min/1.73m2    Calcium 8.7 8.3 - 10.4 MG/DL    Bilirubin, total 0.5 0.2 - 1.1 MG/DL    ALT (SGPT) 22 12 - 65 U/L    AST (SGOT) 16 15 - 37 U/L    Alk. phosphatase 100 50 - 136 U/L    Protein, total 7.0 6.3 - 8.2 g/dL    Albumin 2.9 (L) 3.5 - 5.0 g/dL    Globulin 4.1 (H) 2.3 - 3.5 g/dL    A-G Ratio 0.7 (L) 1.2 - 3.5     DIFFERENTIAL, AUTO    Collection Time: 12/14/21  6:27 AM   Result Value Ref Range    NEUTROPHILS 38 (L) 43 - 78 %    LYMPHOCYTES 52 (H) 13 - 44 %    MONOCYTES 8 4.0 - 12.0 %    EOSINOPHILS 2 0.5 - 7.8 %    BASOPHILS 1 0.0 - 2.0 %    ABS. NEUTROPHILS 2.0 1.7 - 8.2 K/UL    ABS. LYMPHOCYTES 2.7 0.5 - 4.6 K/UL    ABS. MONOCYTES 0.4 0.1 - 1.3 K/UL    ABS. EOSINOPHILS 0.1 0.0 - 0.8 K/UL    ABS. BASOPHILS 0.0 0.0 - 0.2 K/UL    DF AUTOMATED      IMMATURE GRANULOCYTES 0 0.0 - 5.0 %    ABS. IMM.  GRANS. 0.0 0.0 - 0.5 K/UL   MAGNESIUM    Collection Time: 12/14/21  6:27 AM   Result Value Ref Range    Magnesium 2.2 1.8 - 2.4 mg/dL   GLUCOSE, POC Collection Time: 12/14/21  7:45 AM   Result Value Ref Range    Glucose (POC) 166 (H) 65 - 100 mg/dL    Performed by [facility]    GLUCOSE, POC    Collection Time: 12/14/21 11:25 AM   Result Value Ref Range    Glucose (POC) 200 (H) 65 - 100 mg/dL    Performed by [facility] List in Hospital:   Current Facility-Administered Medications   Medication Dose Route Frequency    insulin lispro (HUMALOG) injection 5 Units  5 Units SubCUTAneous TIDAC    dextrose 40% (GLUTOSE) oral gel 1 Tube  15 g Oral PRN    glucagon (GLUCAGEN) injection 1 mg  1 mg IntraMUSCular PRN    dextrose (D50W) injection syrg 12.5-25 g  25-50 mL IntraVENous PRN    insulin glargine (LANTUS) injection 60 Units  60 Units SubCUTAneous DAILY    polyethylene glycol (MIRALAX) packet 17 g  17 g Oral TID    sodium chloride (NS) flush 5-40 mL  5-40 mL IntraVENous Q8H    sodium chloride (NS) flush 5-40 mL  5-40 mL IntraVENous PRN    acetaminophen (TYLENOL) tablet 650 mg  650 mg Oral Q6H PRN    Or    acetaminophen (TYLENOL) suppository 650 mg  650 mg Rectal Q6H PRN    promethazine (PHENERGAN) tablet 12.5 mg  12.5 mg Oral Q6H PRN    Or    ondansetron (ZOFRAN) injection 4 mg  4 mg IntraVENous Q6H PRN    enoxaparin (LOVENOX) injection 40 mg  40 mg SubCUTAneous DAILY    insulin lispro (HUMALOG) injection   SubCUTAneous AC&HS    HYDROcodone-homatropine (HYCODAN) 5-1.5 mg/5 mL (5 mL) oral solution 5 mL  5 mL Oral Q4H PRN    guaiFENesin (ROBITUSSIN) 100 mg/5 mL oral liquid 100 mg  100 mg Oral Q4H PRN     Current Outpatient Medications   Medication Sig    Blood-Glucose Meter monitoring kit Use to monitor blood glucose lvels    glucose blood VI test strips (blood glucose test) strip Use with meter to test glucose levels    lancets misc Use to meter to check glucose    insulin glargine (LANTUS,BASAGLAR) 100 unit/mL (3 mL) inpn Please take 60 units of lantus daily in the morning    insulin lispro (HUMALOG) 100 unit/mL kwikpen 5 Units by SubCUTAneous route Before breakfast, lunch, and dinner for 30 days.  hydroCHLOROthiazide (HYDRODIURIL) 25 mg tablet Take 25 mg by mouth daily. Indications: high blood pressure    metFORMIN (GLUMETZA ER) 500 mg TG24 24 hour tablet Take  by mouth.  meclizine (ANTIVERT) 25 mg tablet Take 1 Tab by mouth three (3) times daily as needed for Dizziness. (Patient not taking: Reported on 12/11/2021)       Allergies   Allergen Reactions    Pcn [Penicillins] Hives     Denies as allergy     Immunization History   Administered Date(s) Administered    Influenza Vaccine [employer]) PF (>6 Mo Flulaval, Fluarix, and >3 Yrs Afluria, Fluzone 90342) 12/14/2021       Recent Vital Data:  Patient Vitals for the past 24 hrs:   Temp Pulse Resp BP SpO2   12/14/21 1118 98.5 °F (36.9 °C) 84 16 (!) 122/90 95 %   12/14/21 0745 97.9 °F (36.6 °C) 74 16 121/81 95 %   12/14/21 0358 97.8 °F (36.6 °C) 61 16 128/84 98 %   12/14/21 0002 98 °F (36.7 °C) 67 20 128/78 99 %   12/13/21 2017 98 °F (36.7 °C) 84 16 119/81 99 %   12/13/21 1537 98.3 °F (36.8 °C) 76 17 (!) 144/77 97 %     Oxygen Therapy  O2 Sat (%): 95 % (12/14/21 1118)  Pulse via Oximetry: 69 beats per minute (12/12/21 1319)  O2 Device: None (Room air) (12/12/21 1525)    Estimated body mass index is 38.18 kg/m² as calculated from the following:    Height as of 12/10/21: 5' 8\" (1.727 m). Weight as of this encounter: 113.9 kg (251 lb 1.6 oz). No intake or output data in the 24 hours ending 12/14/21 1317      Physical Exam:  General:    Well nourished. No overt distress  Head:  Normocephalic, atraumatic  Eyes:  Sclerae appear normal.  Pupils equally round. HENT:  Nares appear normal, no drainage. Moist mucous membranes  Neck:  No restricted ROM. Trachea midline  CV:   RRR. No m/r/g. No JVD  Lungs:   CTAB. No wheezing, rhonchi, or rales. Even, unlabored  Abdomen:   Soft, nontender, nondistended. Extremities: Warm and dry. No cyanosis or clubbing. No edema.     Skin: No rashes. Normal coloration  Neuro:  CN II-XII grossly intact. Psych:  Normal mood and affect. Signed:  Simone Arreaga MD    Part of this note may have been written by using a voice dictation software. The note has been proof read but may still contain some grammatical/other typographical errors.

## 2021-12-14 NOTE — DISCHARGE INSTRUCTIONS
Follow-up with your primary care provider within the next 3 to 5 days to discuss recent hospitalization any changes made to your medications. You will be going home with insulin. Please take the Lantus 60 units daily in the morning. Then take the Humalog 5 units before meals every day. Please discuss this change with your primary care provider as they may make the necessary changes to have your blood glucose better controlled. I would recommend following up with an endocrinologist as well due to such elevation in your A1c. Patient Education        Diabetic Ketoacidosis (DKA): Care Instructions  Overview  Diabetic ketoacidosis (DKA) happens when the body does not have enough insulin and can't get the sugar it needs for energy. When the body can't use sugar for energy, it starts to use fat for energy. This process makes fatty acids called ketones. The ketones build up in the blood and change the chemical balance in your body. This problem can be very dangerous and needs to be treated. Without treatment, it can lead to a coma or death. DKA occurs most often in people with type 1 diabetes. But people with type 2 diabetes also can get it. DKA can be caused by many things. It can happen if you don't take enough insulin. It can also happen if you have an infection or illness like the flu. Sometimes it happens if you are very dehydrated. DKA can only be treated with insulin and fluids. These are often given in a vein (IV). Follow-up care is a key part of your treatment and safety. Be sure to make and go to all appointments, and call your doctor if you are having problems. It's also a good idea to know your test results and keep a list of the medicines you take. How can you care for yourself at home? To reduce your chance of ketoacidosis:  · Take your insulin and other diabetes medicines on time and in the right dose.   ? If an infection caused your DKA and your doctor prescribed antibiotics, take them as directed. Do not stop taking them just because you feel better. You need to take the full course of antibiotics. · Test your blood sugar before meals and at bedtime or as often as your doctor advises. This is the best way to know when your blood sugar is high so you can treat it early. Watching for symptoms is not as helpful. This is because you may not have symptoms until your blood sugar is very high. Or you may not notice them. · Teach others at work and at home how to check your blood sugar. Make sure that someone else knows how do it in case you can't. · Wear or carry medical identification at all times. This is very important in case you are too sick or injured to speak for yourself. · Talk to your doctor about when you can start to exercise again. · Eat regular meals that spread your calories and carbohydrate throughout the day. This will help keep your blood sugar steady. · When you are sick:  ? Take your insulin and diabetes medicines. This is important even if you are vomiting and having trouble eating or drinking. Your blood sugar may go up because you are sick. If you are eating less than normal, you may need to change your dose of insulin. Talk with your doctor about a plan when you are well. Then you will know what to do when you are sick. ? Drink extra fluids to prevent dehydration. These include water, broth, and sugar-free drinks. If you don't drink enough, the insulin from your shot may not get into your blood. So your blood sugar may go up. ? Try to eat as you normally do, with a focus on healthy food choices. ? Check your blood sugar at least every 3 to 4 hours. Check it more often if it's rising fast. If your doctor has told you to take an extra insulin dose for high blood sugar levels (for example, above 240 mg/dL) be sure to take the right amount. If you're not sure how much to take, call your doctor. ? Check your temperature and pulse often.  If your temperature goes up, call your doctor. You may be getting worse. ? If you take insulin, check your urine or blood for ketones, especially when you have high blood sugar (for example, above 240 mg/dL). Call your doctor if your ketone level is moderate or high. If you know your blood sugar is high, treat it before it gets worse. · If you missed your usual dose of insulin or other diabetes medicine, take the missed dose or take the amount your doctor told you to take if this happens. · If you and your doctor decide on a dose of extra-fast-acting insulin, give yourself the right dose. If you take insulin and your doctor has not told you how much fast-acting insulin to take based on your blood sugar level, call your doctor. · Drink extra water or sugar-free drinks to prevent dehydration. · Wait 30 minutes after you take extra insulin or missed medicines. Then check your blood sugar again. · If symptoms of high blood sugar get worse or your blood sugar level keeps rising, call your doctor. If you start to feel sleepy or confused, call 911. When should you call for help? Call 911 anytime you think you may need emergency care. For example, call if:    · You passed out (lost consciousness).     · You are confused or cannot think clearly.     · Your blood sugar is very high or very low. Watch closely for changes in your health, and be sure to contact your doctor if:    · Your blood sugar stays outside the level your doctor set for you.     · You have any problems. Where can you learn more? Go to http://www.gray.com/  Enter [id number] in the search box to learn more about \"Diabetic Ketoacidosis (DKA): Care Instructions. \"  Current as of: August 31, 2020               Content Version: 13.0  © 8994-1269 [employer]. Care instructions adapted under license by [employer] (which disclaims liability or warranty for this information).  If you have questions about a medical condition or this instruction, always ask your healthcare professional. [place] [address] any warranty or liability for your use of this information.

## 2021-12-14 NOTE — DIABETES MGMT
Patient admitted with DKA. Per chart review patient was diagnosed with diabetes 1 week ago. Blood glucose ranged 214-362 yesterday with patient receiving Lantus 60 units and Humalog 42 units. Blood glucose this morning was 166. Reviewed patient current regimen: Lantus 60 units daily, Humalog 5 units with meals, and Humalog correctional insulin. Creatinine 0.75. GFR >60. Glycemic control improving. Patient will need prescription for glucometer kit, test strips, and lancets at discharge so that the patient may obtain the meter covered by their insurance. Patient prefers insulin pens. Patient will need prescription for insulin pens and pen needles at discharge. Provider updated via [employer].

## 2022-01-28 ENCOUNTER — HOSPITAL ENCOUNTER (EMERGENCY)
Age: 47
Discharge: HOME | End: 2022-01-28
Attending: EMERGENCY MEDICINE
Payer: COMMERCIAL

## 2022-01-28 ENCOUNTER — APPOINTMENT (OUTPATIENT)
Dept: GENERAL RADIOLOGY | Age: 47
End: 2022-01-28
Attending: EMERGENCY MEDICINE
Payer: COMMERCIAL

## 2022-01-28 VITALS
BODY MASS INDEX: 38.04 KG/M2 | HEIGHT: 68 IN | TEMPERATURE: 98 F | DIASTOLIC BLOOD PRESSURE: 64 MMHG | SYSTOLIC BLOOD PRESSURE: 132 MMHG | OXYGEN SATURATION: 99 % | HEART RATE: 80 BPM | RESPIRATION RATE: 18 BRPM | WEIGHT: 251 LBS

## 2022-01-28 DIAGNOSIS — S80.00XA CONTUSION OF KNEE, UNSPECIFIED LATERALITY, INITIAL ENCOUNTER: ICD-10-CM
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
## 2022-01-28 DIAGNOSIS — R07.89 MUSCULOSKELETAL CHEST PAIN: Primary | ICD-10-CM

## 2022-01-28 DIAGNOSIS — S39.012A BACK STRAIN, INITIAL ENCOUNTER: ICD-10-CM

## 2022-01-28 PROCEDURE — 74011250637 HC RX REV CODE- 250/637: Performed by: EMERGENCY MEDICINE

## 2022-01-28 PROCEDURE — 93005 ELECTROCARDIOGRAM TRACING: CPT | Performed by: EMERGENCY MEDICINE

## 2022-01-28 PROCEDURE — 71046 X-RAY EXAM CHEST 2 VIEWS: CPT

## 2022-01-28 PROCEDURE — 99284 EMERGENCY DEPT VISIT MOD MDM: CPT

## 2022-01-28 RX ORDER — HYDROCODONE BITARTRATE AND ACETAMINOPHEN 10; 325 MG/1; MG/1
1 TABLET ORAL
Status: COMPLETED | OUTPATIENT
Start: 2022-01-28 | End: 2022-01-28

## 2022-01-28 RX ORDER — IBUPROFEN 800 MG/1
800 TABLET ORAL
Qty: 20 TABLET | Refills: 0 | Status: SHIPPED | OUTPATIENT
Start: 2022-01-28 | End: 2022-02-04

## 2022-01-28 RX ORDER — CYCLOBENZAPRINE HCL 10 MG
10 TABLET ORAL
Qty: 30 TABLET | Refills: 0 | Status: SHIPPED | OUTPATIENT
Start: 2022-01-28

## 2022-01-28 RX ORDER — IBUPROFEN 800 MG/1
800 TABLET ORAL
Status: COMPLETED | OUTPATIENT
Start: 2022-01-28 | End: 2022-01-28

## 2022-01-28 RX ORDER — CYCLOBENZAPRINE HCL 10 MG
10 TABLET ORAL
Status: COMPLETED | OUTPATIENT
Start: 2022-01-28 | End: 2022-01-28

## 2022-01-28 RX ADMIN — HYDROCODONE BITARTRATE AND ACETAMINOPHEN 1 TABLET: 10; 325 TABLET ORAL at 19:32

## 2022-01-28 RX ADMIN — CYCLOBENZAPRINE 10 MG: 10 TABLET, FILM COATED ORAL at 19:32

## 2022-01-28 RX ADMIN — IBUPROFEN 800 MG: 800 TABLET, FILM COATED ORAL at 19:32
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
## 2022-01-29 LAB
ATRIAL RATE: 88 BPM
CALCULATED P AXIS, ECG09: 40 DEGREES
CALCULATED R AXIS, ECG10: -45 DEGREES
CALCULATED T AXIS, ECG11: 20 DEGREES
DIAGNOSIS, 93000: NORMAL
P-R INTERVAL, ECG05: 186 MS
Q-T INTERVAL, ECG07: 368 MS
QRS DURATION, ECG06: 88 MS
QTC CALCULATION (BEZET), ECG08: 445 MS
VENTRICULAR RATE, ECG03: 88 BPM

## 2022-01-29 NOTE — ED NOTES
I have reviewed discharge instructions with the patient and spouse. The patient and spouse verbalized understanding. Patient left ED via Discharge Method: ambulatory to Home with spouse. Opportunity for questions and clarification provided. Patient given 2 scripts. To continue your aftercare when you leave the hospital, you may receive an automated call from our care team to check in on how you are doing. This is a free service and part of our promise to provide the best care and service to meet your aftercare needs.  If you have questions, or wish to unsubscribe from this service please call [phone]. Thank you for Choosing our [facility] Emergency Department.
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
## 2022-03-18 PROBLEM — E11.10 DKA, TYPE 2 (HCC): Status: ACTIVE | Noted: 2021-12-11

## 2022-03-19 PROBLEM — I10 ESSENTIAL HYPERTENSION: Status: ACTIVE | Noted: 2018-07-09

## 2025-08-12 ENCOUNTER — APPOINTMENT (OUTPATIENT)
Dept: URBAN - METROPOLITAN AREA CLINIC 329 | Facility: CLINIC | Age: 50
Setting detail: DERMATOLOGY
End: 2025-08-12

## 2025-08-12 DIAGNOSIS — L40.0 PSORIASIS VULGARIS: ICD-10-CM | Status: INADEQUATELY CONTROLLED

## 2025-08-12 DIAGNOSIS — L71.8 OTHER ROSACEA: ICD-10-CM | Status: INADEQUATELY CONTROLLED

## 2025-08-12 DIAGNOSIS — L68.0 HIRSUTISM: ICD-10-CM | Status: INADEQUATELY CONTROLLED

## 2025-08-12 PROCEDURE — ? MEDICATION COUNSELING

## 2025-08-12 PROCEDURE — ? COUNSELING

## 2025-08-12 PROCEDURE — ? PRESCRIPTION

## 2025-08-12 PROCEDURE — ? MDM - TREATMENT GOALS

## 2025-08-12 PROCEDURE — ? PRESCRIPTION MEDICATION MANAGEMENT

## 2025-08-12 RX ORDER — FLUOCINOLONE ACETONIDE 0.11 MG/ML
OIL TOPICAL
Qty: 118.28 | Refills: 4 | Status: ERX | COMMUNITY
Start: 2025-08-12

## 2025-08-12 RX ORDER — OXYMETAZOLINE HYDROCHLORIDE 30 G/1
CREAM TOPICAL
Qty: 30 | Refills: 5 | Status: CANCELLED | COMMUNITY
Start: 2025-08-12

## 2025-08-12 RX ORDER — SPIRONOLACTONE 50 MG/1
TABLET, FILM COATED ORAL
Qty: 60 | Refills: 2 | Status: CANCELLED | COMMUNITY
Start: 2025-08-12

## 2025-08-12 RX ADMIN — OXYMETAZOLINE HYDROCHLORIDE: 30 CREAM TOPICAL at 00:00

## 2025-08-12 RX ADMIN — SPIRONOLACTONE: 50 TABLET, FILM COATED ORAL at 00:00

## 2025-08-12 RX ADMIN — FLUOCINOLONE ACETONIDE: 0.11 OIL TOPICAL at 00:00

## 2025-08-12 ASSESSMENT — LOCATION SIMPLE DESCRIPTION DERM
LOCATION SIMPLE: RIGHT SCALP
LOCATION SIMPLE: LEFT CHEEK
LOCATION SIMPLE: LEFT SCALP
LOCATION SIMPLE: SUBMENTAL CHIN
LOCATION SIMPLE: RIGHT CHEEK

## 2025-08-12 ASSESSMENT — PGA PSORIASIS: PGA PSORIASIS 2020: MODERATE

## 2025-08-12 ASSESSMENT — ITCH NUMERIC RATING SCALE: HOW SEVERE IS YOUR ITCHING?: 9

## 2025-08-12 ASSESSMENT — LOCATION ZONE DERM
LOCATION ZONE: FACE
LOCATION ZONE: SCALP

## 2025-08-12 ASSESSMENT — LOCATION DETAILED DESCRIPTION DERM
LOCATION DETAILED: RIGHT CENTRAL FRONTAL SCALP
LOCATION DETAILED: SUBMENTAL CHIN
LOCATION DETAILED: LEFT INFERIOR CENTRAL MALAR CHEEK
LOCATION DETAILED: LEFT CENTRAL FRONTAL SCALP
LOCATION DETAILED: RIGHT INFERIOR CENTRAL MALAR CHEEK

## 2025-08-12 ASSESSMENT — BSA PSORIASIS: % BODY COVERED IN PSORIASIS: 5

## 2025-08-20 ENCOUNTER — RX ONLY (RX ONLY)
Age: 50
End: 2025-08-20

## 2025-08-20 RX ORDER — SPIRONOLACTONE 50 MG/1
TABLET, FILM COATED ORAL
Qty: 60 | Refills: 8 | Status: ERX | COMMUNITY
Start: 2025-08-20

## 2025-08-20 RX ORDER — OXYMETAZOLINE HYDROCHLORIDE 30 G/1
CREAM TOPICAL
Qty: 30 | Refills: 7 | Status: ERX | COMMUNITY
Start: 2025-08-20